# Patient Record
Sex: FEMALE | Race: WHITE | NOT HISPANIC OR LATINO | Employment: UNEMPLOYED | URBAN - METROPOLITAN AREA
[De-identification: names, ages, dates, MRNs, and addresses within clinical notes are randomized per-mention and may not be internally consistent; named-entity substitution may affect disease eponyms.]

---

## 2024-01-07 ENCOUNTER — HOSPITAL ENCOUNTER (EMERGENCY)
Facility: HOSPITAL | Age: 29
Discharge: HOME | End: 2024-01-07
Attending: EMERGENCY MEDICINE | Admitting: EMERGENCY MEDICINE
Payer: COMMERCIAL

## 2024-01-07 ENCOUNTER — APPOINTMENT (OUTPATIENT)
Dept: CT IMAGING | Facility: HOSPITAL | Age: 29
End: 2024-01-07
Payer: COMMERCIAL

## 2024-01-07 ENCOUNTER — APPOINTMENT (OUTPATIENT)
Dept: GENERAL RADIOLOGY | Facility: HOSPITAL | Age: 29
End: 2024-01-07
Payer: COMMERCIAL

## 2024-01-07 VITALS
DIASTOLIC BLOOD PRESSURE: 68 MMHG | BODY MASS INDEX: 36.44 KG/M2 | WEIGHT: 246.03 LBS | OXYGEN SATURATION: 96 % | HEART RATE: 61 BPM | TEMPERATURE: 98.6 F | RESPIRATION RATE: 18 BRPM | SYSTOLIC BLOOD PRESSURE: 99 MMHG | HEIGHT: 69 IN

## 2024-01-07 DIAGNOSIS — F14.10 COCAINE ABUSE: ICD-10-CM

## 2024-01-07 DIAGNOSIS — R73.9 HYPERGLYCEMIA: ICD-10-CM

## 2024-01-07 DIAGNOSIS — F10.10 ALCOHOL ABUSE: ICD-10-CM

## 2024-01-07 DIAGNOSIS — R55 SYNCOPE, UNSPECIFIED SYNCOPE TYPE: Primary | ICD-10-CM

## 2024-01-07 DIAGNOSIS — R56.9 SEIZURE-LIKE ACTIVITY: ICD-10-CM

## 2024-01-07 LAB
ALBUMIN SERPL-MCNC: 5.1 G/DL (ref 3.5–5.2)
ALBUMIN/GLOB SERPL: 2.1 G/DL
ALP SERPL-CCNC: 108 U/L (ref 39–117)
ALT SERPL W P-5'-P-CCNC: 24 U/L (ref 1–33)
ANION GAP SERPL CALCULATED.3IONS-SCNC: 16 MMOL/L (ref 5–15)
AST SERPL-CCNC: 21 U/L (ref 1–32)
BASOPHILS # BLD AUTO: 0.05 10*3/MM3 (ref 0–0.2)
BASOPHILS NFR BLD AUTO: 0.6 % (ref 0–1.5)
BILIRUB SERPL-MCNC: 0.2 MG/DL (ref 0–1.2)
BUN SERPL-MCNC: 7 MG/DL (ref 6–20)
BUN/CREAT SERPL: 7.8 (ref 7–25)
CALCIUM SPEC-SCNC: 9.2 MG/DL (ref 8.6–10.5)
CHLORIDE SERPL-SCNC: 109 MMOL/L (ref 98–107)
CO2 SERPL-SCNC: 22 MMOL/L (ref 22–29)
CREAT SERPL-MCNC: 0.9 MG/DL (ref 0.57–1)
DEPRECATED RDW RBC AUTO: 42.3 FL (ref 37–54)
EGFRCR SERPLBLD CKD-EPI 2021: 90.6 ML/MIN/1.73
EOSINOPHIL # BLD AUTO: 0.08 10*3/MM3 (ref 0–0.4)
EOSINOPHIL NFR BLD AUTO: 1 % (ref 0.3–6.2)
ERYTHROCYTE [DISTWIDTH] IN BLOOD BY AUTOMATED COUNT: 12.2 % (ref 12.3–15.4)
GLOBULIN UR ELPH-MCNC: 2.4 GM/DL
GLUCOSE SERPL-MCNC: 125 MG/DL (ref 65–99)
HCG SERPL QL: NEGATIVE
HCT VFR BLD AUTO: 42.9 % (ref 34–46.6)
HGB BLD-MCNC: 14.8 G/DL (ref 12–15.9)
HOLD SPECIMEN: NORMAL
IMM GRANULOCYTES # BLD AUTO: 0.03 10*3/MM3 (ref 0–0.05)
IMM GRANULOCYTES NFR BLD AUTO: 0.4 % (ref 0–0.5)
LYMPHOCYTES # BLD AUTO: 3.32 10*3/MM3 (ref 0.7–3.1)
LYMPHOCYTES NFR BLD AUTO: 40.1 % (ref 19.6–45.3)
MCH RBC QN AUTO: 32.8 PG (ref 26.6–33)
MCHC RBC AUTO-ENTMCNC: 34.5 G/DL (ref 31.5–35.7)
MCV RBC AUTO: 95.1 FL (ref 79–97)
MONOCYTES # BLD AUTO: 0.49 10*3/MM3 (ref 0.1–0.9)
MONOCYTES NFR BLD AUTO: 5.9 % (ref 5–12)
NEUTROPHILS NFR BLD AUTO: 4.3 10*3/MM3 (ref 1.7–7)
NEUTROPHILS NFR BLD AUTO: 52 % (ref 42.7–76)
NRBC BLD AUTO-RTO: 0 /100 WBC (ref 0–0.2)
NT-PROBNP SERPL-MCNC: <36 PG/ML (ref 0–450)
PLATELET # BLD AUTO: 323 10*3/MM3 (ref 140–450)
PMV BLD AUTO: 10.3 FL (ref 6–12)
POTASSIUM SERPL-SCNC: 4 MMOL/L (ref 3.5–5.2)
PROT SERPL-MCNC: 7.5 G/DL (ref 6–8.5)
QT INTERVAL: 378 MS
QTC INTERVAL: 452 MS
RBC # BLD AUTO: 4.51 10*6/MM3 (ref 3.77–5.28)
SODIUM SERPL-SCNC: 147 MMOL/L (ref 136–145)
TROPONIN T SERPL HS-MCNC: <6 NG/L
WBC NRBC COR # BLD AUTO: 8.27 10*3/MM3 (ref 3.4–10.8)
WHOLE BLOOD HOLD COAG: NORMAL
WHOLE BLOOD HOLD SPECIMEN: NORMAL

## 2024-01-07 PROCEDURE — 83880 ASSAY OF NATRIURETIC PEPTIDE: CPT | Performed by: EMERGENCY MEDICINE

## 2024-01-07 PROCEDURE — 70450 CT HEAD/BRAIN W/O DYE: CPT

## 2024-01-07 PROCEDURE — 93005 ELECTROCARDIOGRAM TRACING: CPT | Performed by: EMERGENCY MEDICINE

## 2024-01-07 PROCEDURE — 85025 COMPLETE CBC W/AUTO DIFF WBC: CPT

## 2024-01-07 PROCEDURE — 96360 HYDRATION IV INFUSION INIT: CPT

## 2024-01-07 PROCEDURE — 84484 ASSAY OF TROPONIN QUANT: CPT | Performed by: EMERGENCY MEDICINE

## 2024-01-07 PROCEDURE — 25810000003 SODIUM CHLORIDE 0.9 % SOLUTION: Performed by: EMERGENCY MEDICINE

## 2024-01-07 PROCEDURE — 84703 CHORIONIC GONADOTROPIN ASSAY: CPT

## 2024-01-07 PROCEDURE — 71045 X-RAY EXAM CHEST 1 VIEW: CPT

## 2024-01-07 PROCEDURE — 99284 EMERGENCY DEPT VISIT MOD MDM: CPT

## 2024-01-07 PROCEDURE — 80053 COMPREHEN METABOLIC PANEL: CPT

## 2024-01-07 RX ORDER — SODIUM CHLORIDE 0.9 % (FLUSH) 0.9 %
10 SYRINGE (ML) INJECTION AS NEEDED
Status: DISCONTINUED | OUTPATIENT
Start: 2024-01-07 | End: 2024-01-07 | Stop reason: HOSPADM

## 2024-01-07 RX ADMIN — SODIUM CHLORIDE 1000 ML: 9 INJECTION, SOLUTION INTRAVENOUS at 10:47

## 2024-01-07 NOTE — DISCHARGE INSTRUCTIONS
You have been given emergency department evaluation.  This evaluation is intended to rule out life-threatening conditions.  Is not a complete evaluation.  You could require further testing as determined by your primary care physician or any referred specialist.  Please follow-up with all doctors that you are referred to.  Please be sure to take your prescribed medications and follow any specific instructions in the discharge instructions.  Please follow-up with your primary care physician within 48 hours.  Please have your primary care provider recheck your blood pressure.  Please return to the emergency department if you experience chest pain, shortness of breath, abdominal pain, fever greater than 102, intractable vomiting.  Please return to the emergency department if your symptoms continue or worsen, or if you begin to experience any other concerning symptom.    You have been seen for seizure-like activity.  You will need to follow-up with your primary care and the referred neurologist.  Please do not drive, operate machinery, swim, bathe alone, or engage in any other dangerous activities until cleared to do so by either your primary care doctor or your neurologist.      You have been seen for substance abuse related complaint.  Please stay with a responsible adult for 24 hours.  Please avoid driving for 24 hours.  Please quit taking alcohol/drugs, as it can result in your death or permanent disability.    Substance abuse treatment centers  The following list are some of the available alcohol and drug abuse treatment centers in the [facility] area.  This list is by no means inclusive of all available options, nor does it imply that these options are better than any other in the area.  Cost and available treatment plans to vary from site to site.  You will need to call, to see which if any of these options will work for your needs.    [facility]  [address]  [facility] [address]  [phone]    [facility]  [address] [place] [address]  [phone]    [facility]  [address] [facility] [address]    Alcoholics anonymous [address]  [phone]    [facility]-medical stabilization service  [address] [id number] [phone]    [facility], Addiction recovery  [address]  [address] [place] [address]  [phone]  [facility]  [address], [place] [address] [phone]    [facility] [address] [id number] [phone]

## 2024-01-07 NOTE — ED PROVIDER NOTES
EMERGENCY DEPARTMENT ENCOUNTER  Room Number:  11/11  Date of encounter:  1/7/2024  PCP: Provider, No Known  Patient Care Team:  Provider, No Known as PCP - General     HPI:  Context: Miguel Le is a 28 y.o. female who presents to the ED c/o chief complaint of syncope.  Patient reports that she was out drinking last night, had multiple beers as well as several shots, also reports that she did a small amount of cocaine, denies any other illicit substances.  Patient reports that she was doing alcohol and drugs for recreational purposes, prior to going out she was at [facility].  Patient reports that she woke up extremely hung over this morning.  Patient reports that she was sitting on the edge of the tub, felt shaky, sweaty, passed out.  Patient reports that there was seizure-like activity, denies any incontinence of bowel or bladder, no oral lingual lacerations, denies any injury occurred.  Patient reports that she was confused afterwards.  Patient denied any chest pain shortness of breath or palpitations.  Patient denies any history of syncope in the past, no cardiac history, denies any history of seizures but does report that she has first-degree relative with seizures.    MEDICAL HISTORY REVIEW  Reviewed in [facility]    PAST MEDICAL HISTORY  Active Ambulatory Problems     Diagnosis Date Noted    No Active Ambulatory Problems     Resolved Ambulatory Problems     Diagnosis Date Noted    No Resolved Ambulatory Problems     No Additional Past Medical History       PAST SURGICAL HISTORY  No past surgical history on file.    FAMILY HISTORY  No family history on file.    SOCIAL HISTORY  Social History     Socioeconomic History    Marital status:        ALLERGIES  Patient has no known allergies.    The patient's allergies have been reviewed    REVIEW OF SYSTEMS  All systems reviewed and negative except for those discussed in HPI.     PHYSICAL EXAM  I have reviewed the triage vital signs and nursing notes.  ED  Triage Vitals   Temp Heart Rate Resp BP SpO2   01/07/24 1019 01/07/24 1019 01/07/24 1019 01/07/24 1021 01/07/24 1019   98.6 °F (37 °C) 106 18 119/82 97 %      Temp src Heart Rate Source Patient Position BP Location FiO2 (%)   01/07/24 1019 01/07/24 1019 01/07/24 1019 01/07/24 1019 --   Tympanic Monitor Sitting Right arm        General: No acute distress.  HENT: NCAT, PERRL, Nares patent.  Eyes: no scleral icterus.  Neck: trachea midline, no ROM limitations.  CV: regular rhythm, regular rate.  Respiratory: normal effort, CTAB.  Abdomen: soft, nondistended, NTTP, no rebound tenderness, no guarding or rigidity.  Musculoskeletal: no deformity.  Neuro: Alert and oriented x3, extraocular motion intact, pupils are equal and round reactive to light, cranial nerves II through XII are grossly intact, normal speech, moves all extremities well, 5 out of 5 strength all 4 extremities, sensation intact light touch all 4 extremities, no ataxia.  Skin: warm, dry.    LAB RESULTS  Recent Results (from the past 24 hour(s))   Comprehensive Metabolic Panel    Collection Time: 01/07/24 10:25 AM    Specimen: Blood   Result Value Ref Range    Glucose 125 (H) 65 - 99 mg/dL    BUN 7 6 - 20 mg/dL    Creatinine 0.90 0.57 - 1.00 mg/dL    Sodium 147 (H) 136 - 145 mmol/L    Potassium 4.0 3.5 - 5.2 mmol/L    Chloride 109 (H) 98 - 107 mmol/L    CO2 22.0 22.0 - 29.0 mmol/L    Calcium 9.2 8.6 - 10.5 mg/dL    Total Protein 7.5 6.0 - 8.5 g/dL    Albumin 5.1 3.5 - 5.2 g/dL    ALT (SGPT) 24 1 - 33 U/L    AST (SGOT) 21 1 - 32 U/L    Alkaline Phosphatase 108 39 - 117 U/L    Total Bilirubin 0.2 0.0 - 1.2 mg/dL    Globulin 2.4 gm/dL    A/G Ratio 2.1 g/dL    BUN/Creatinine Ratio 7.8 7.0 - 25.0    Anion Gap 16.0 (H) 5.0 - 15.0 mmol/L    eGFR 90.6 >60.0 mL/min/1.73   hCG, Serum, Qualitative    Collection Time: 01/07/24 10:25 AM    Specimen: Blood   Result Value Ref Range    HCG Qualitative Negative Negative   Green Top (Gel)    Collection Time: 01/07/24 10:25 AM    Result Value Ref Range    Extra Tube Hold for add-ons.    Lavender Top    Collection Time: 01/07/24 10:25 AM   Result Value Ref Range    Extra Tube hold for add-on    Light Blue Top    Collection Time: 01/07/24 10:25 AM   Result Value Ref Range    Extra Tube Hold for add-ons.    CBC Auto Differential    Collection Time: 01/07/24 10:25 AM    Specimen: Blood   Result Value Ref Range    WBC 8.27 3.40 - 10.80 10*3/mm3    RBC 4.51 3.77 - 5.28 10*6/mm3    Hemoglobin 14.8 12.0 - 15.9 g/dL    Hematocrit 42.9 34.0 - 46.6 %    MCV 95.1 79.0 - 97.0 fL    MCH 32.8 26.6 - 33.0 pg    MCHC 34.5 31.5 - 35.7 g/dL    RDW 12.2 (L) 12.3 - 15.4 %    RDW-SD 42.3 37.0 - 54.0 fl    MPV 10.3 6.0 - 12.0 fL    Platelets 323 140 - 450 10*3/mm3    Neutrophil % 52.0 42.7 - 76.0 %    Lymphocyte % 40.1 19.6 - 45.3 %    Monocyte % 5.9 5.0 - 12.0 %    Eosinophil % 1.0 0.3 - 6.2 %    Basophil % 0.6 0.0 - 1.5 %    Immature Grans % 0.4 0.0 - 0.5 %    Neutrophils, Absolute 4.30 1.70 - 7.00 10*3/mm3    Lymphocytes, Absolute 3.32 (H) 0.70 - 3.10 10*3/mm3    Monocytes, Absolute 0.49 0.10 - 0.90 10*3/mm3    Eosinophils, Absolute 0.08 0.00 - 0.40 10*3/mm3    Basophils, Absolute 0.05 0.00 - 0.20 10*3/mm3    Immature Grans, Absolute 0.03 0.00 - 0.05 10*3/mm3    nRBC 0.0 0.0 - 0.2 /100 WBC   High Sensitivity Troponin T    Collection Time: 01/07/24 10:25 AM    Specimen: Blood   Result Value Ref Range    HS Troponin T <6 <14 ng/L   BNP    Collection Time: 01/07/24 10:25 AM    Specimen: Blood   Result Value Ref Range    proBNP <36.0 0.0 - 450.0 pg/mL   ECG 12 Lead Syncope    Collection Time: 01/07/24 10:46 AM   Result Value Ref Range    QT Interval 378 ms    QTC Interval 452 ms       I ordered the above labs and reviewed the results.    RADIOLOGY  CT Head Without Contrast    Result Date: 1/7/2024  Emergency CT scan of the head on 1/7/2024  CLINICAL HISTORY: Syncope, seizure-like activity this AM  TECHNIQUE: Spiral CT images were obtained from the base of the  skull to the vertex without intravenous contrast. The images were reformatted in smoothen 3 mm thick axial, sagittal and coronal CT sections with brain algorithm and 2 mm thick axial CT sections with high-resolution bone algorithm  There are no prior studies for comparison.  FINDINGS: The brain parenchyma is normal in attenuation. The ventricles are normal in size. I see no focal mass effect and no midline shift and no extra-axial fluid collections are identified and there is no evidence of acute intra cranial hemorrhage. No acute skull fractures identified. The calvarium and skull base are normal in appearance. The paranasal sinuses and the mastoid air cells and middle ear cavities are clear.      1. Normal head CT.  Radiation dose reduction techniques were utilized, including automated exposure control and exposure modulation based on body size.   This report was finalized on 1/7/2024 12:58 PM by Dr. Luciano Vargas M.D on Workstation: [email or web address]      XR Chest 1 View    Result Date: 1/7/2024  ONE-VIEW PORTABLE CHEST  HISTORY: Dizziness. Weakness. Possible seizure.  FINDINGS: The lungs are well expanded and clear and the heart and hilar structures are normal. There is no acute disease.  This report was finalized on 1/7/2024 11:22 AM by Dr. Faisal Harper M.D on Workstation: [id number]       I ordered the above noted radiological studies. I reviewed the images and results. I agree with the radiologist interpretation.    PROCEDURES  Procedures    MEDICATIONS GIVEN IN ER  Medications   sodium chloride 0.9 % flush 10 mL (has no administration in time range)   sodium chloride 0.9 % bolus 1,000 mL (1,000 mL Intravenous New Bag 1/7/24 1047)       PROGRESS, DATA ANALYSIS, CONSULTS, AND MEDICAL DECISION MAKING  A complete history and physical exam have been performed.  All available laboratory and imaging results have been reviewed by myself prior to disposition.    MDM    After the initial H&P, I discussed pertinent information  from history and physical exam with patient/family.  Discussed differential diagnosis.  Discussed plan for ED evaluation/workup/treatment.  All questions answered.  Patient/family is agreeable with plan.  ED Course as of 01/07/24 1302   Sun Jan 07, 2024   1043 My differential diagnosis for syncope includes but is not limited to:  Vasovagal reflex - situational stimulus, micturition, defecation, cough, sneezing, swallowing, postprandial state, react sinus hypersensitivity  Vascular-prolonged recumbency, sudden postural change, prolonged standing, hypovolemia, vasodilator drugs, autonomic neuropathy, adrenal insufficiency, subclavian steal, pulmonary embolism  Cardiac -arrhythmia, heart block, myocardial infarction, aortic stenosis, cardiac myxoma, cardiac, LV Dysfunction, Aortic Dissection, Pulmonary Hypertension, Pulmonary Stenosis, Pacemaker Failure  CNS-seizure, hypoxia, hypoglycemia, TIA,(basal vertebral), hydrocephalus     [JG]   1050 EKG independently viewed and contemporaneously interpreted by ED physician. Time: 10:46 AM.  Rate 86.  Interpretation: Normal sinus rhythm, normal axis, left atrial enlargement, normal QRS, no acute ST changes. [JG]   1109 Orthostatic positive, did not have dizziness associated, patient receiving IV fluids. [JG]   1140 Reviewed chest x-ray in PACS, no pulmonary infiltrates per my read. [JG]   1141 Patient placed on cardiac monitor for complaint of syncope, my interpretation is normal sinus rhythm. [JG]   1225 I reviewed CT head imaging in PACS, no intracranial hemorrhage per my read. [JG]   1256 Phone call with radiologist.  I had previously reviewed the images. I discussed the patient, imaging, and their interpretation.  CT head imaging negative for acute pathology.  See dictated report for final interpretation.     [JG]   1300 ED workup is unremarkable other than slight hyperglycemia.  Patient is well-appearing appears appropriate for discharge with outpatient follow-up. [JG]    1300 Patient reassessed, discussed ED workup and results, discussed plan for discharge.  Discussed need for follow-up with primary care, discussed referral to neurology.  Patient given seizure precautions, instructed not to drive until released by primary care and neurology.  Given resources to help out with alcohol and drug cessation.  Given extensive discussion return precautions, discharging. [JG]      ED Course User Index  [JG] Sherman Ken MD       AS OF 13:02 EST VITALS:    BP - 104/69  HR - 80  TEMP - 98.6 °F (37 °C) (Tympanic)  O2 SATS - 97%    DIAGNOSIS  Final diagnoses:   Syncope, unspecified syncope type   Seizure-like activity   Hyperglycemia   Alcohol abuse   Cocaine abuse         DISPOSITION  DISCHARGE    Patient discharged in stable condition.    Reviewed implications of results, diagnosis, meds, responsibility to follow up, warning signs and symptoms of possible worsening, potential complications and reasons to return to ER.    Patient/Family voiced understanding of above instructions.    Discussed plan for discharge, as there is no emergent indication for admission. Patient referred to primary care provider for BP management due to today's BP. Pt/family is agreeable and understands need for follow up and repeat testing.  Pt is aware that discharge does not mean that nothing is wrong but it indicates no emergency is present that requires admission and they must continue care with follow-up as given below or physician of their choice.     FOLLOW-UP  Your PCP    Schedule an appointment as soon as possible for a visit in 2 days  even if well    PATIENT CONNECTION - [facility] [id number] [phone]    if you are unable to follow up with your PCP    [facility]  [address] [facility] [address]  [facility] [address] [phone]  Schedule an appointment as soon as possible for a visit in 2 days           Medication List      No changes were made to  your prescriptions during this visit.            Sherman Ken MD  01/07/24 2147

## 2024-01-09 ENCOUNTER — TELEPHONE (OUTPATIENT)
Dept: NEUROLOGY | Facility: OTHER | Age: 29
End: 2024-01-09
Payer: COMMERCIAL

## 2024-01-09 NOTE — TELEPHONE ENCOUNTER
PT WAS DISCHARGED FROM E.R  WAS TOLD DIDN'T NEED REF AS HER NOTES WERE IN HER CHART , THAT IS NOT PROTOCOL , NEED INTERNAL REFERRAL I ASKED IF THEY GAVE HER SUGGESTIONS FOR PCP , WAS NOT GIVEN ANY , I GOT HOLD OF PCP [facility] OFFICE , TRANS FOR HER TO TRY AND GET APPT, ASKED IF SHE CAN'T GET ONE TILL LATER TO CALL E.R BACK LET THEM NO PROTOCOL IS FOR THEM TO PUT INTERNAL REF IN IF FEEL SHE NEEDS TO BE SEEN   SHE WAS GIVEN NO RX AND WAS TOLD NO DRIVING

## 2024-01-10 ENCOUNTER — OFFICE VISIT (OUTPATIENT)
Dept: FAMILY MEDICINE CLINIC | Facility: CLINIC | Age: 29
End: 2024-01-10
Payer: COMMERCIAL

## 2024-01-10 ENCOUNTER — PATIENT ROUNDING (BHMG ONLY) (OUTPATIENT)
Dept: FAMILY MEDICINE CLINIC | Facility: CLINIC | Age: 29
End: 2024-01-10
Payer: COMMERCIAL

## 2024-01-10 VITALS
HEIGHT: 69 IN | BODY MASS INDEX: 36.43 KG/M2 | SYSTOLIC BLOOD PRESSURE: 110 MMHG | HEART RATE: 79 BPM | WEIGHT: 246 LBS | DIASTOLIC BLOOD PRESSURE: 74 MMHG | OXYGEN SATURATION: 99 %

## 2024-01-10 DIAGNOSIS — Z82.0 FAMILY HISTORY OF SEIZURE DISORDER: ICD-10-CM

## 2024-01-10 DIAGNOSIS — Z00.00 ROUTINE HEALTH MAINTENANCE: ICD-10-CM

## 2024-01-10 DIAGNOSIS — R56.9 SEIZURE-LIKE ACTIVITY: Primary | ICD-10-CM

## 2024-01-10 NOTE — PROGRESS NOTES
A My-Chart message has been sent to the patient for PATIENT ROUNDING with [facility] [place], [place]

## 2024-01-10 NOTE — PROGRESS NOTES
"Chief Complaint  Establish Care and Seizures (Had a episode Sunday. Wants referral to neurology )    Subjective        HPI   Miguel presents to [facility] for a new patient visit. She was seen in the ED 1/7 for syncope versus seizure.     Pt relates that the night before episode, was drinking ETOH, didn't have dinner prior. She drank approx 4-5 beers, does not drink regularly. Also did what she describes as a tiny amount of cocaine, also states she does not use regularly (maybe 2-3x per year). She never did eat that night. She awakened the next morning with plans to go to [employer] (still hadn't eaten by this point), went to the bathroom, started having cold sweats, sat down on side of tub, then was found down. No incontinence, tongue biting. Took her awhile for her to regain mental status. Reportedly, her fingers and lips were purple and she had some L sided arm and facial weakness.     No medical problems otherwise. Prior PCP was in [place].     FH: Sister does have seizures, first at 26        Objective   Vital Signs:  Vitals:    01/10/24 1059   BP: 110/74   BP Location: Left arm   Patient Position: Sitting   Cuff Size: Large Adult   Pulse: 79   SpO2: 99%   Weight: 112 kg (246 lb)   Height: 175.3 cm (69\")          Physical Exam  Constitutional:       General: She is not in acute distress.     Appearance: Normal appearance. She is not toxic-appearing.   HENT:      Head: Normocephalic and atraumatic.      Mouth/Throat:      Mouth: Mucous membranes are moist.   Eyes:      General: No scleral icterus.     Conjunctiva/sclera: Conjunctivae normal.   Cardiovascular:      Rate and Rhythm: Normal rate and regular rhythm.      Heart sounds: Normal heart sounds. No murmur heard.     No friction rub. No gallop.   Pulmonary:      Effort: Pulmonary effort is normal. No respiratory distress.      Breath sounds: Normal breath sounds.   Musculoskeletal:         General: No swelling, tenderness or deformity. " Normal range of motion.      Cervical back: Normal range of motion and neck supple.   Skin:     General: Skin is warm and dry.      Findings: No lesion or rash.   Neurological:      General: No focal deficit present.      Mental Status: She is alert and oriented to person, place, and time.   Psychiatric:         Mood and Affect: Mood normal.         Behavior: Behavior normal.         Judgment: Judgment normal.          Result Review :     The following data was reviewed by: Vero Velazco MD on 01/10/2024:  CBC & Differential (01/07/2024 10:25)  Comprehensive Metabolic Panel (01/07/2024 10:25)  hCG, Serum, Qualitative (01/07/2024 10:25)  High Sensitivity Troponin T (01/07/2024 10:25)  BNP (01/07/2024 10:25)  CT Head Without Contrast (01/07/2024 12:22)  XR Chest 1 View (01/07/2024 11:00)         Assessment and Plan    Diagnoses and all orders for this visit:    1. Seizure-like activity (Primary)  Assessment & Plan:  Pt presented to ED with seizure versus syncope. She did have what sounds like a post-ictal state and weakness in L arm and face. Work up was overall unremarkable besides Na 147 (likely dehydrated). Her sister has seizures, her first seizure was at age 26 as well. It's possible her first seizure was provoked by ETOH/cocaine use and dehydration/hypoglycemia. It's also possible she had convulsive syncope. I told pt I can't prove this was a seizure but we discussed not driving x 90 days, no water immersion in pools/tubs, etc. in the event this was a seizure. We discussed staying hydrated, no ETOH use, no cocaine use (pt states she does not use either regularly). Pt asked about exercise, OK for light exercise such as walking and casual sand volleyball w friends. MRI brain w and wo contrast ordered as well as urgent neurology referral.     Orders:  -     MRI Brain With & Without Contrast; Future  -     Ambulatory Referral to Neurology    2. Routine health maintenance  Assessment & Plan:  Obtain prior PCP  records. Had labs, pap, AWV in August 2023.       3. Family history of seizure disorder  -     MRI Brain With & Without Contrast; Future  -     Ambulatory Referral to Neurology        Follow Up   Return in about 2 months (around 3/10/2024) for Recheck, Next scheduled follow up.  Patient was given instructions and counseling regarding her condition or for health maintenance advice. Please see specific information pulled into the AVS if appropriate.

## 2024-01-10 NOTE — ASSESSMENT & PLAN NOTE
Pt presented to ED with seizure versus syncope. She did have what sounds like a post-ictal state and weakness in L arm and face. Work up was overall unremarkable besides Na 147 (likely dehydrated). Her sister has seizures, her first seizure was at age 26 as well. It's possible her first seizure was provoked by ETOH/cocaine use and dehydration/hypoglycemia. It's also possible she had convulsive syncope. I told pt I can't prove this was a seizure but we discussed not driving x 90 days, no water immersion in pools/tubs, etc. in the event this was a seizure. We discussed staying hydrated, no ETOH use, no cocaine use (pt states she does not use either regularly). Pt asked about exercise, OK for light exercise such as walking and casual sand volleyball w friends. MRI brain w and wo contrast ordered as well as urgent neurology referral.

## 2024-01-10 NOTE — Clinical Note
Please obtain medical records (labs, pap, last OV note) from [facility], Mery Dawson NP

## 2024-01-25 ENCOUNTER — TELEPHONE (OUTPATIENT)
Dept: FAMILY MEDICINE CLINIC | Facility: CLINIC | Age: 29
End: 2024-01-25
Payer: COMMERCIAL

## 2024-01-25 NOTE — TELEPHONE ENCOUNTER
Caller: [employer]    Best call back number: [phone]     Who are you requesting to speak with (clinical staff, provider,  specific staff member): CLINICAL STAFF    Do you know the name of the person who called: DILLON    What was the call regarding: STATED THAT THEY NEED TO SPEAK WITH SOMEONE ABOUT THE PATIENTS MRI THEY HAVE. STATED THAT THE INSURANCE APPROVED THE TEST BUT DENIED THE LOCATION. PLEASE CALL AND ADVISE

## 2024-01-26 ENCOUNTER — TELEPHONE (OUTPATIENT)
Dept: FAMILY MEDICINE CLINIC | Facility: CLINIC | Age: 29
End: 2024-01-26
Payer: COMMERCIAL

## 2024-01-26 NOTE — TELEPHONE ENCOUNTER
Yue from financial clearance called and stated pt MRI approved but the location was denied   # [phone]

## 2024-02-05 ENCOUNTER — TELEPHONE (OUTPATIENT)
Dept: FAMILY MEDICINE CLINIC | Facility: CLINIC | Age: 29
End: 2024-02-05
Payer: COMMERCIAL

## 2024-02-15 ENCOUNTER — TELEPHONE (OUTPATIENT)
Dept: FAMILY MEDICINE CLINIC | Facility: CLINIC | Age: 29
End: 2024-02-15
Payer: COMMERCIAL

## 2024-03-15 NOTE — PROGRESS NOTES
Mode of Visit: Video  Location of patient: home  [place] [facility] [place]  You have chosen to receive care through a telehealth visit. The patient verbally consented to the video visit.  The visit included audio and video interaction. No technical issues occurred during this visit     Subjective       Chief Complaint   Patient presents with    Follow-up         HPI:        Miguel is a 28 y.o. female who presents to [facility] today for follow up on MRI. EEG and MRI were normal.     She saw neuro NP at [facility] who recommended a 72-hour home EEG.  Patient a bit hesitant to get it done, concerned that she will go through the time and expense to have it be normal and not . She is also wondering if it would be beneficial to look at her heart in the event this was a TIA or syncope. No further syncopal or seizure-like events.             PE:   Objective     There is no height or weight on file to calculate BMI.    Physical Exam   Constitutional: She appears well-developed and well-nourished. No distress.   HENT:   Head: Normocephalic and atraumatic.   Pulmonary/Chest: Effort normal.  No respiratory distress.  Neurological: She is alert.   Psychiatric: She has a normal mood and affect.             The following data was reviewed by: Vero Velazco MD on 03/18/2024:  ECG 12 Lead Syncope (01/07/2024 10:46)        A/P:     Assessment & Plan   Diagnoses and all orders for this visit:    1. Seizure-like activity (Primary)    2. Loss of consciousness  -     Adult Transthoracic Echo Complete W/ Cont if Necessary Per Protocol; Future    3. Abnormal EKG  -     Adult Transthoracic Echo Complete W/ Cont if Necessary Per Protocol; Future    4. Screening for diabetes mellitus  -     Hemoglobin A1c    5. Screening for lipid disorders  -     Lipid Panel    6. Routine health maintenance  -     CBC (No Diff)  -     Comprehensive Metabolic Panel  -     Hemoglobin A1c  -     Lipid  Panel    Counseled patient that this episode is most likely a provoked seizure but cannot rule out syncope. TIA less likely given age and presentation but not impossible.  Her sister has a seizure disorder.  Neurology nurse practitioner has elected to hold off on antiepileptic therapy unless patient has a second event or 72-hour monitoring is abnormal.  As per HPI, patient hesitates to get the 72-hour test done.  I recommend she discuss with neuro NP.    Pt wondering about getting heart checked.  Certainly, this episode could have been syncopal although seemingly less likely than seizure.  I reviewed her EKG from the emergency department. It was not 100% normal per the cardiology read.  I told patient we could consider an echocardiogram to evaluate for valvulopathies, shunts, etc that could increase risk of TIA/CVA. We decided to proceed with this test, ordered with bubble study.    Labs ordered for prior to her physical, if she chooses to get them done ahead of time. Should be fasting.        Follow up:   Return in about 24 weeks (around 9/2/2024) for Annual physical.

## 2024-03-18 ENCOUNTER — TELEMEDICINE (OUTPATIENT)
Dept: FAMILY MEDICINE CLINIC | Facility: CLINIC | Age: 29
End: 2024-03-18
Payer: COMMERCIAL

## 2024-03-18 DIAGNOSIS — Z13.1 SCREENING FOR DIABETES MELLITUS: ICD-10-CM

## 2024-03-18 DIAGNOSIS — Z00.00 ROUTINE HEALTH MAINTENANCE: ICD-10-CM

## 2024-03-18 DIAGNOSIS — R56.9 SEIZURE-LIKE ACTIVITY: Primary | ICD-10-CM

## 2024-03-18 DIAGNOSIS — Z13.220 SCREENING FOR LIPID DISORDERS: ICD-10-CM

## 2024-03-18 DIAGNOSIS — R40.20 LOSS OF CONSCIOUSNESS: ICD-10-CM

## 2024-03-18 DIAGNOSIS — R94.31 ABNORMAL EKG: ICD-10-CM

## 2024-04-01 ENCOUNTER — HOSPITAL ENCOUNTER (OUTPATIENT)
Dept: CARDIOLOGY | Facility: HOSPITAL | Age: 29
Discharge: HOME | End: 2024-04-01
Admitting: INTERNAL MEDICINE
Payer: COMMERCIAL

## 2024-04-01 VITALS
BODY MASS INDEX: 32.51 KG/M2 | SYSTOLIC BLOOD PRESSURE: 110 MMHG | HEART RATE: 74 BPM | WEIGHT: 240 LBS | HEIGHT: 72 IN | DIASTOLIC BLOOD PRESSURE: 80 MMHG | OXYGEN SATURATION: 98 %

## 2024-04-01 DIAGNOSIS — R40.20 LOSS OF CONSCIOUSNESS: ICD-10-CM

## 2024-04-01 DIAGNOSIS — R94.31 ABNORMAL EKG: ICD-10-CM

## 2024-04-01 LAB
AORTIC ARCH: 2.4 CM
AORTIC DIMENSIONLESS INDEX: 0.6 (DI)
ASCENDING AORTA: 2.6 CM
BH CV ECHO MEAS - ACS: 1.95 CM
BH CV ECHO MEAS - AO MAX PG: 6.5 MMHG
BH CV ECHO MEAS - AO MEAN PG: 4 MMHG
BH CV ECHO MEAS - AO ROOT DIAM: 2.6 CM
BH CV ECHO MEAS - AO V2 MAX: 127.6 CM/SEC
BH CV ECHO MEAS - AO V2 VTI: 32.4 CM
BH CV ECHO MEAS - AVA(I,D): 1.71 CM2
BH CV ECHO MEAS - EDV(CUBED): 109.3 ML
BH CV ECHO MEAS - EDV(MOD-SP2): 109 ML
BH CV ECHO MEAS - EDV(MOD-SP4): 107 ML
BH CV ECHO MEAS - EF(MOD-BP): 62.1 %
BH CV ECHO MEAS - EF(MOD-SP2): 61.5 %
BH CV ECHO MEAS - EF(MOD-SP4): 61.7 %
BH CV ECHO MEAS - ESV(CUBED): 37.9 ML
BH CV ECHO MEAS - ESV(MOD-SP2): 42 ML
BH CV ECHO MEAS - ESV(MOD-SP4): 41 ML
BH CV ECHO MEAS - FS: 29.8 %
BH CV ECHO MEAS - IVS/LVPW: 0.93 CM
BH CV ECHO MEAS - IVSD: 0.89 CM
BH CV ECHO MEAS - LAT PEAK E' VEL: 14.1 CM/SEC
BH CV ECHO MEAS - LV DIASTOLIC VOL/BSA (35-75): 46.5 CM2
BH CV ECHO MEAS - LV MASS(C)D: 151.9 GRAMS
BH CV ECHO MEAS - LV MAX PG: 3.3 MMHG
BH CV ECHO MEAS - LV MEAN PG: 1.85 MMHG
BH CV ECHO MEAS - LV SYSTOLIC VOL/BSA (12-30): 17.8 CM2
BH CV ECHO MEAS - LV V1 MAX: 91.2 CM/SEC
BH CV ECHO MEAS - LV V1 VTI: 21 CM
BH CV ECHO MEAS - LVIDD: 4.8 CM
BH CV ECHO MEAS - LVIDS: 3.4 CM
BH CV ECHO MEAS - LVOT AREA: 2.6 CM2
BH CV ECHO MEAS - LVOT DIAM: 1.83 CM
BH CV ECHO MEAS - LVPWD: 0.96 CM
BH CV ECHO MEAS - MED PEAK E' VEL: 11.1 CM/SEC
BH CV ECHO MEAS - MV A DUR: 0.1 SEC
BH CV ECHO MEAS - MV A MAX VEL: 40 CM/SEC
BH CV ECHO MEAS - MV DEC SLOPE: 523.6 CM/SEC2
BH CV ECHO MEAS - MV DEC TIME: 0.18 SEC
BH CV ECHO MEAS - MV E MAX VEL: 89.3 CM/SEC
BH CV ECHO MEAS - MV E/A: 2.23
BH CV ECHO MEAS - MV MAX PG: 3.6 MMHG
BH CV ECHO MEAS - MV MEAN PG: 1.04 MMHG
BH CV ECHO MEAS - MV P1/2T: 53.4 MSEC
BH CV ECHO MEAS - MV V2 VTI: 30.5 CM
BH CV ECHO MEAS - MVA(P1/2T): 4.1 CM2
BH CV ECHO MEAS - MVA(VTI): 1.82 CM2
BH CV ECHO MEAS - PA ACC TIME: 0.08 SEC
BH CV ECHO MEAS - PA V2 MAX: 85.5 CM/SEC
BH CV ECHO MEAS - PULM A REVS DUR: 0.09 SEC
BH CV ECHO MEAS - PULM A REVS VEL: 24.8 CM/SEC
BH CV ECHO MEAS - PULM DIAS VEL: 33.5 CM/SEC
BH CV ECHO MEAS - PULM S/D: 1.55
BH CV ECHO MEAS - PULM SYS VEL: 51.9 CM/SEC
BH CV ECHO MEAS - QP/QS: 0.66
BH CV ECHO MEAS - RAP SYSTOLE: 3 MMHG
BH CV ECHO MEAS - RV MAX PG: 2.46 MMHG
BH CV ECHO MEAS - RV V1 MAX: 78.3 CM/SEC
BH CV ECHO MEAS - RV V1 VTI: 16.4 CM
BH CV ECHO MEAS - RVOT DIAM: 1.69 CM
BH CV ECHO MEAS - RVSP: 20.5 MMHG
BH CV ECHO MEAS - SI(MOD-SP2): 29.1 ML/M2
BH CV ECHO MEAS - SI(MOD-SP4): 28.7 ML/M2
BH CV ECHO MEAS - SUP REN AO DIAM: 1.7 CM
BH CV ECHO MEAS - SV(LVOT): 55.5 ML
BH CV ECHO MEAS - SV(MOD-SP2): 67 ML
BH CV ECHO MEAS - SV(MOD-SP4): 66 ML
BH CV ECHO MEAS - SV(RVOT): 36.6 ML
BH CV ECHO MEAS - TAPSE (>1.6): 1.98 CM
BH CV ECHO MEAS - TR MAX PG: 17.5 MMHG
BH CV ECHO MEAS - TR MAX VEL: 209 CM/SEC
BH CV ECHO MEASUREMENTS AVERAGE E/E' RATIO: 7.09
BH CV ECHO SHUNT ASSESSMENT PERFORMED (HIDDEN SCRIPTING): 1
BH CV VAS BP LEFT ARM: NORMAL MMHG
BH CV XLRA - RV BASE: 3.1 CM
BH CV XLRA - RV LENGTH: 7.9 CM
BH CV XLRA - RV MID: 3.6 CM
BH CV XLRA - TDI S': 12.6 CM/SEC
LEFT ATRIUM VOLUME INDEX: 19 ML/M2
SINUS: 2.35 CM
STJ: 2.24 CM

## 2024-04-01 PROCEDURE — 93306 TTE W/DOPPLER COMPLETE: CPT | Performed by: INTERNAL MEDICINE

## 2024-04-01 PROCEDURE — 25510000001 PERFLUTREN (DEFINITY) 8.476 MG IN SODIUM CHLORIDE (PF) 0.9 % 10 ML INJECTION: Performed by: INTERNAL MEDICINE

## 2024-04-01 PROCEDURE — 93306 TTE W/DOPPLER COMPLETE: CPT

## 2024-04-01 RX ADMIN — PERFLUTREN 1.5 ML: 6.52 INJECTION, SUSPENSION INTRAVENOUS at 08:43

## 2024-12-20 ENCOUNTER — INITIAL PRENATAL (OUTPATIENT)
Dept: OBSTETRICS AND GYNECOLOGY | Facility: CLINIC | Age: 29
End: 2024-12-20
Payer: COMMERCIAL

## 2024-12-20 VITALS — WEIGHT: 253 LBS | DIASTOLIC BLOOD PRESSURE: 78 MMHG | BODY MASS INDEX: 34.31 KG/M2 | SYSTOLIC BLOOD PRESSURE: 116 MMHG

## 2024-12-20 DIAGNOSIS — Z3A.01 LESS THAN 8 WEEKS GESTATION OF PREGNANCY: Primary | ICD-10-CM

## 2024-12-20 DIAGNOSIS — Z34.91 INITIAL OBSTETRIC VISIT IN FIRST TRIMESTER: ICD-10-CM

## 2024-12-20 LAB
B-HCG UR QL: POSITIVE
EXPIRATION DATE: ABNORMAL
INTERNAL NEGATIVE CONTROL: ABNORMAL
INTERNAL POSITIVE CONTROL: ABNORMAL
Lab: ABNORMAL

## 2024-12-20 NOTE — PROGRESS NOTES
CC: Initial obstetric visit    HPI: 29-year-old  2 para 0 presents at 7-4/7 weeks for her initial obstetric visit.  Overall, she is feeling well.  She has some episodes of nausea, but no vomiting.  Denies any major medical problems.  Obstetric history is significant for a medical  x 1.  Surgical history significant for tonsillectomy.    Review of systems    This is positive for nausea.  Negative for significant vomiting.  Negative for abdominal or pelvic pain.  All other systems are reviewed and are negative.    Assessment and plan    1.  Intrauterine pregnancy in the first trimester.  I recommend an ultrasound to confirm estimated gestational age and the patient agrees.  2.  Prenatal counseling was undertaken.  The patient is taking prenatal vitamins.  Prenatal labs have been ordered.  3.  Counseled regarding genetic screening.  The baby's father has a family history of a genetic abnormality.  He is uncertain of the name of it.  We discussed invasive versus noninvasive prenatal screening versus no prenatal screening.  The patient is considering noninvasive prenatal screening, but will decide by the next visit.  4.  Nausea in pregnancy.  Counseled regarding lifestyle modifications such as frequent small meals, the use of genia.  We also discussed the possibility of medical treatment with vitamin B6 or antiemetics.  The patient declines prescription medications at this time.

## 2024-12-22 LAB
BACTERIA UR CULT: NORMAL
BACTERIA UR CULT: NORMAL

## 2024-12-24 LAB
AMPHETAMINES UR QL SCN: NEGATIVE NG/ML
BARBITURATES UR QL SCN: NEGATIVE NG/ML
BENZODIAZ UR QL: NEGATIVE NG/ML
BZE UR QL: NEGATIVE NG/ML
CANNABINOIDS UR QL SCN: NEGATIVE NG/ML
METHADONE UR QL SCN: NEGATIVE NG/ML
OPIATES UR QL: NEGATIVE NG/ML
PCP UR QL SCN: NEGATIVE NG/ML
PROPOXYPH UR QL SCN: NEGATIVE NG/ML

## 2024-12-26 LAB
ABO GROUP BLD: ABNORMAL
BASOPHILS # BLD AUTO: 0 X10E3/UL (ref 0–0.2)
BASOPHILS NFR BLD AUTO: 0 %
BLD GP AB SCN SERPL QL: NEGATIVE
EOSINOPHIL # BLD AUTO: 0.1 X10E3/UL (ref 0–0.4)
EOSINOPHIL NFR BLD AUTO: 1 %
ERYTHROCYTE [DISTWIDTH] IN BLOOD BY AUTOMATED COUNT: 11.4 % (ref 11.7–15.4)
HBV SURFACE AG SERPL QL IA: NEGATIVE
HCT VFR BLD AUTO: 38 % (ref 34–46.6)
HCV AB SERPL QL IA: NORMAL
HCV IGG SERPL QL IA: NON REACTIVE
HGB BLD-MCNC: 12.8 G/DL (ref 11.1–15.9)
HIV 1+2 AB+HIV1 P24 AG SERPL QL IA: NON REACTIVE
IMM GRANULOCYTES # BLD AUTO: 0 X10E3/UL (ref 0–0.1)
IMM GRANULOCYTES NFR BLD AUTO: 0 %
LYMPHOCYTES # BLD AUTO: 2.1 X10E3/UL (ref 0.7–3.1)
LYMPHOCYTES NFR BLD AUTO: 17 %
MCH RBC QN AUTO: 31.8 PG (ref 26.6–33)
MCHC RBC AUTO-ENTMCNC: 33.7 G/DL (ref 31.5–35.7)
MCV RBC AUTO: 94 FL (ref 79–97)
MONOCYTES # BLD AUTO: 0.7 X10E3/UL (ref 0.1–0.9)
MONOCYTES NFR BLD AUTO: 6 %
NEUTROPHILS # BLD AUTO: 9.2 X10E3/UL (ref 1.4–7)
NEUTROPHILS NFR BLD AUTO: 76 %
PLATELET # BLD AUTO: 365 X10E3/UL (ref 150–450)
RBC # BLD AUTO: 4.03 X10E6/UL (ref 3.77–5.28)
RH BLD: POSITIVE
RUBV IGG SERPL IA-ACNC: 16.5 INDEX
TREPONEMA PALLIDUM IGG+IGM AB [PRESENCE] IN SERUM OR PLASMA BY IMMUNOASSAY: NON REACTIVE
WBC # BLD AUTO: 12.1 X10E3/UL (ref 3.4–10.8)

## 2025-01-14 ENCOUNTER — ROUTINE PRENATAL (OUTPATIENT)
Dept: OBSTETRICS AND GYNECOLOGY | Facility: CLINIC | Age: 30
End: 2025-01-14
Payer: COMMERCIAL

## 2025-01-14 VITALS — BODY MASS INDEX: 34.18 KG/M2 | SYSTOLIC BLOOD PRESSURE: 116 MMHG | DIASTOLIC BLOOD PRESSURE: 78 MMHG | WEIGHT: 252 LBS

## 2025-01-14 DIAGNOSIS — Z3A.11 11 WEEKS GESTATION OF PREGNANCY: Primary | ICD-10-CM

## 2025-01-14 LAB
GLUCOSE UR STRIP-MCNC: NEGATIVE MG/DL
PROT UR STRIP-MCNC: NEGATIVE MG/DL

## 2025-01-14 NOTE — PROGRESS NOTES
CC: Obstetric visit    HPI: 29-year-old  2 para 0 presents at 11 and 1 sevenths weeks for her obstetric visit.  Overall, she is feeling well.  Does not have nausea and vomiting.  Does not have abdominal or pelvic pain.    Review of systems    This is negative for fever or chills.  Negative for abdominal or pelvic pain.  Negative for vaginal bleeding.    Physical examination    The abdomen is soft and gravid.  There is no epigastric tenderness.  No fundal tenderness.  No CVA tenderness.  No suprapubic tenderness.  Extremities are equal in size    Assessment and plan    1.  Intrauterine pregnancy at 11 and 1 sevenths weeks  2.  The previously visualized subchorionic hematoma has completely resolved on today's ultrasound.  Counseled and questions answered.  3.  Counseled regarding genetic screening.  The patient would like to do noninvasive prenatal screening today.  This has been ordered.  There is a family history of duplication of chromosome 14 on the paternal side.  We discussed referral to maternal-fetal medicine for more specific testing.  The patient declines this.

## 2025-01-20 LAB
5P15 DELETION (CRI-DU-CHAT): NEGATIVE
CFDNA.FET/CFDNA.TOTAL SFR FETUS: NORMAL %
CITATION REF LAB TEST: NORMAL
FET 13+18+21+X+Y ANEUP PLAS.CFDNA: NEGATIVE
FET 1P36 DEL RISK WBC.DNA+CFDNA QL: NEGATIVE
FET 22Q11.2 DEL RISK WBC.DNA+CFDNA QL: NEGATIVE
FET CHR 11Q23 DEL PLAS.CFDNA QL: NEGATIVE
FET CHR 15Q11 DEL PLAS.CFDNA QL: NEGATIVE
FET CHR 21 TS PLAS.CFDNA QL: NEGATIVE
FET CHR 4P16 DEL PLAS.CFDNA QL: NEGATIVE
FET CHR 8Q24 DEL PLAS.CFDNA QL: NEGATIVE
FET MS X RISK WBC.DNA+CFDNA QL: NEGATIVE
FET SEX PLAS.CFDNA DOSAGE CFDNA: NORMAL
FET TS 13 RISK PLAS.CFDNA QL: NEGATIVE
FET TS 18 RISK WBC.DNA+CFDNA QL: NEGATIVE
FET X + Y ANEUP RISK PLAS.CFDNA SEQ-IMP: NEGATIVE
GA EST FROM CONCEPTION DATE: NORMAL D
GAINS/LOSSES >=7 MB: NEGATIVE
GESTATIONAL AGE > 9:: YES
LAB DIRECTOR NAME PROVIDER: NORMAL
LAB DIRECTOR NAME PROVIDER: NORMAL
LABORATORY COMMENT REPORT: NORMAL
LIMITATIONS OF THE TEST: NORMAL
NOTE: NORMAL
OTHER AUTOSOMAL ANEUPLOIDIES: NEGATIVE
PERFORMANCE CHARACTERISTICS: NORMAL
POSITIVE PREDICTIVE VALUE: NORMAL
REF LAB TEST METHOD: NORMAL
TEST PERFORMANCE INFO SPEC: NORMAL

## 2025-02-03 ENCOUNTER — TELEPHONE (OUTPATIENT)
Dept: OBSTETRICS AND GYNECOLOGY | Facility: CLINIC | Age: 30
End: 2025-02-03
Payer: COMMERCIAL

## 2025-02-11 ENCOUNTER — ROUTINE PRENATAL (OUTPATIENT)
Dept: OBSTETRICS AND GYNECOLOGY | Facility: CLINIC | Age: 30
End: 2025-02-11
Payer: COMMERCIAL

## 2025-02-11 VITALS — BODY MASS INDEX: 34.18 KG/M2 | DIASTOLIC BLOOD PRESSURE: 76 MMHG | SYSTOLIC BLOOD PRESSURE: 116 MMHG | WEIGHT: 252 LBS

## 2025-02-11 DIAGNOSIS — Z3A.15 15 WEEKS GESTATION OF PREGNANCY: Primary | ICD-10-CM

## 2025-02-11 PROCEDURE — 0502F SUBSEQUENT PRENATAL CARE: CPT | Performed by: OBSTETRICS & GYNECOLOGY

## 2025-02-11 NOTE — PROGRESS NOTES
CC: Obstetric visit    HPI: 29-year-old  2 para 0 presents at 15 and 1 sevenths weeks for her obstetric visit.  Her nausea has improved.  She has occasional headaches, but they have responded to Tylenol.    Review of systems    This is negative for fever or chills.  Negative for nausea or vomiting.  Negative for abdominal or pelvic pain.    Physical examination    The abdomen is soft and gravid.  There is no epigastric tenderness.  No fundal tenderness.  No CVA tenderness.  No suprapubic tenderness.  Extremities are equal in size    Assessment and plan    1.  Intrauterine pregnancy at 15-5/7 weeks  2.  Free fetal DNA testing was negative.  Counseled regarding additional genetic screening.  Cystic fibrosis carrier screening was ordered at the last visit, but not performed.  The patient would like to do this test today.  Also, we discussed AFP testing for spina bifida.  The patient would like to proceed.  These have been ordered.  3.  Screening ultrasound at the next visit.

## 2025-03-25 ENCOUNTER — ROUTINE PRENATAL (OUTPATIENT)
Dept: OBSTETRICS AND GYNECOLOGY | Facility: CLINIC | Age: 30
End: 2025-03-25
Payer: COMMERCIAL

## 2025-03-25 VITALS — DIASTOLIC BLOOD PRESSURE: 78 MMHG | WEIGHT: 260 LBS | BODY MASS INDEX: 35.26 KG/M2 | SYSTOLIC BLOOD PRESSURE: 116 MMHG

## 2025-03-25 DIAGNOSIS — Z3A.21 21 WEEKS GESTATION OF PREGNANCY: Primary | ICD-10-CM

## 2025-03-25 PROCEDURE — 0502F SUBSEQUENT PRENATAL CARE: CPT | Performed by: OBSTETRICS & GYNECOLOGY

## 2025-03-25 NOTE — PROGRESS NOTES
CC: Obstetric visit    HPI: 29-year-old  2 para 0 presents at 21 and 1 sevenths weeks for her obstetric visit.  She has occasionally felt fetal movement.  She has no complaints today.    Review of systems    This is negative for fever or chills.  Negative for nausea or vomiting.  Negative for abdominal or pelvic pain.    Physical examination    The abdomen is soft and gravid.  There is no epigastric tenderness.  No fundal tenderness.  No CVA tenderness.  No suprapubic tenderness.  Extremities are equal in size    Assessment and plan    1.  Intrauterine pregnancy at 21 and 1 sevenths weeks  2.  Screening ultrasound was reviewed and discussed with the patient.  This is a normal anatomic survey with the exception of suboptimal views of the cardiac outflow tracts of the fetal face.  Ultrasound will be repeated at the next visit.  3.  1 hour glucose tolerance test at the next visit.  Counseled.  4.  The patient has elected not to proceed with cystic fibrosis carrier screening or AFP testing.

## 2025-04-22 ENCOUNTER — ROUTINE PRENATAL (OUTPATIENT)
Dept: OBSTETRICS AND GYNECOLOGY | Facility: CLINIC | Age: 30
End: 2025-04-22
Payer: COMMERCIAL

## 2025-04-22 VITALS — BODY MASS INDEX: 36.08 KG/M2 | DIASTOLIC BLOOD PRESSURE: 74 MMHG | WEIGHT: 266 LBS | SYSTOLIC BLOOD PRESSURE: 118 MMHG

## 2025-04-22 DIAGNOSIS — Z3A.25 25 WEEKS GESTATION OF PREGNANCY: Primary | ICD-10-CM

## 2025-04-22 LAB
GLUCOSE UR STRIP-MCNC: NEGATIVE MG/DL
PROT UR STRIP-MCNC: NEGATIVE MG/DL

## 2025-04-22 NOTE — PROGRESS NOTES
CC: Obstetric visit    HPI: 29-year-old  2 para 0 presents at 25 and 1 sevenths weeks for her obstetric visit.  Her baby is moving actively.  She has no complaints today.    Review of systems    This is negative for fever or chills.  Negative for nausea or vomiting.  Negative for abdominal or pelvic pain.    Physical examination    The abdomen is soft and gravid.  There is no epigastric tenderness.  No fundal tenderness.  No CVA tenderness.  No suprapubic tenderness.  Extremities are equal in size    Assessment and plan    1.  Intrauterine pregnancy at 25 and 1 sevenths weeks  2.  1 hour glucose tolerance test today.  3.  Ultrasound was reviewed and discussed with the patient.  The ultrasound was normal with the exception of suboptimal views of the cardiac outflow tracts.  This is our second attempt to visualize these.  While no abnormality was seen, visualization was suboptimal.  I recommend a maternal-fetal medicine ultrasound and the patient agrees.  This has been ordered.

## 2025-05-12 ENCOUNTER — TRANSCRIBE ORDERS (OUTPATIENT)
Dept: ULTRASOUND IMAGING | Facility: HOSPITAL | Age: 30
End: 2025-05-12
Payer: COMMERCIAL

## 2025-05-12 DIAGNOSIS — O28.3 ABNORMAL FETAL ULTRASOUND: Primary | ICD-10-CM

## 2025-05-13 ENCOUNTER — HOSPITAL ENCOUNTER (OUTPATIENT)
Dept: ULTRASOUND IMAGING | Facility: HOSPITAL | Age: 30
Discharge: HOME | End: 2025-05-13
Admitting: STUDENT IN AN ORGANIZED HEALTH CARE EDUCATION/TRAINING PROGRAM
Payer: COMMERCIAL

## 2025-05-13 ENCOUNTER — OFFICE VISIT (OUTPATIENT)
Dept: OBSTETRICS AND GYNECOLOGY | Facility: CLINIC | Age: 30
End: 2025-05-13
Payer: COMMERCIAL

## 2025-05-13 VITALS
TEMPERATURE: 97.7 F | WEIGHT: 267 LBS | BODY MASS INDEX: 36.21 KG/M2 | SYSTOLIC BLOOD PRESSURE: 125 MMHG | HEART RATE: 106 BPM | OXYGEN SATURATION: 99 % | DIASTOLIC BLOOD PRESSURE: 78 MMHG

## 2025-05-13 DIAGNOSIS — Z3A.28 28 WEEKS GESTATION OF PREGNANCY: ICD-10-CM

## 2025-05-13 DIAGNOSIS — O28.3 ABNORMAL FETAL ULTRASOUND: ICD-10-CM

## 2025-05-13 DIAGNOSIS — Z36.2 ENCOUNTER FOR FOLLOW-UP ULTRASOUND OF FETAL ANATOMY: Primary | ICD-10-CM

## 2025-05-13 PROCEDURE — 76811 OB US DETAILED SNGL FETUS: CPT

## 2025-05-13 RX ORDER — PRENATAL VIT NO.126/IRON/FOLIC 28MG-0.8MG
TABLET ORAL DAILY
COMMUNITY

## 2025-05-13 NOTE — PROGRESS NOTES
Pt reports that she is doing well and denies vaginal bleeding, cramping, contractions or LOF at this time. Reports active fetal movement. Denies HA, visual changes or epigastric pain. Denies any additional complaints at time of appointment. Next OB appointment scheduled for 5/20.    Vitals:    05/13/25 1408   BP: 125/78   Pulse: 106   Temp: 97.7 °F (36.5 °C)   SpO2: 99%

## 2025-05-13 NOTE — LETTER
May 15, 2025     Peewee Nolasco MD  [address]  [address]  [place] [address]    Patient: Miguel Le   YOB: 1995   Date of Visit: 2025       Dear Peewee Nolasco MD,    Thank you for referring Miguel Le to me for evaluation. Below is a copy of my consult note.    If you have questions, please do not hesitate to call me. I look forward to following Miguel along with you.         Sincerely,        Elda Mai MD        CC: No Recipients    Pt reports that she is doing well and denies vaginal bleeding, cramping, contractions or LOF at this time. Reports active fetal movement. Denies HA, visual changes or epigastric pain. Denies any additional complaints at time of appointment. Next OB appointment scheduled for .    Vitals:    25 1408   BP: 125/78   Pulse: 106   Temp: 97.7 °F (36.5 °C)   SpO2: 99%          MATERNAL FETAL MEDICINE Consult Note    Dear Dr Peewee Nolasco MD:    Thank you for your kind referral of Miguel Le.  As you know, she is a 29 y.o.   28w2d gestation (Estimated Date of Delivery: 25). This is a consult.      Her antepartum course is complicated by:  Suboptimal anatomy --continues to be suboptimal.       Aneuploidy Screening: LOW RISK MaterniT Genome - Blood, (01/15/2025 15:48)   Carrier Screening:not completed     HPI: No contractions, LOF, vaginal bleeding. Normal fetal movement.   She denies headache, vision changes, shortness of breath, acute changes in edema, and RUQ pain.       Review of History:  History reviewed. No pertinent past medical history.  Past Surgical History:   Procedure Laterality Date   • TONSILLECTOMY         OB Hx:   OB History    Para Term  AB Living   2    1 0   SAB IAB Ectopic Molar Multiple Live Births        0      # Outcome Date GA Lbr Craig/2nd Weight Sex Type Anes PTL Lv   2 Current            1 AB 2019                 Social History     Socioeconomic History   • Marital status:    Tobacco  Use   • Smoking status: Never     Passive exposure: Never   • Smokeless tobacco: Never   Vaping Use   • Vaping status: Never Used   Substance and Sexual Activity   • Alcohol use: Not Currently     Alcohol/week: 3.0 standard drinks of alcohol     Types: 3 Cans of beer per week     Comment: Will drink socially   • Drug use: Never   • Sexual activity: Yes     Partners: Male     Birth control/protection: Condom, Natural family planning/Rhythm     Family History   Problem Relation Age of Onset   • Vision loss Mother         Glaucoma since age 49   • Heart disease Maternal Grandfather    • Cancer Maternal Grandmother         Both grandmothers had breast cancer age 50 and 70 not hereditarty   • Heart disease Paternal Grandfather    • Cancer Paternal Grandmother         Both grandmothers had breast cancer age 50 and 70 not hereditarty   • Anxiety disorder Sister       No Known Allergies   Current Outpatient Medications on File Prior to Visit   Medication Sig Dispense Refill   • Magnesium 100 MG capsule Take  by mouth.     • prenatal vitamin (prenatal, CLASSIC, vitamin) tablet Take  by mouth Daily.       No current facility-administered medications on file prior to visit.        Past obstetric, gynecological, medical, surgical, family and social history reviewed.  Relevant lab work and imaging reviewed.    Review of systems  As above in HPI     Vitals:    05/13/25 1408   BP: 125/78   BP Location: Right arm   Patient Position: Sitting   Pulse: 106   Temp: 97.7 °F (36.5 °C)   TempSrc: Temporal   SpO2: 99%   Weight: 121 kg (267 lb)       PHYSICAL EXAM   General: No acute distress  Respiratory: No increased work of breathing  Cardiac: Mild tachycardia  appropriate for pregnancy  Abdominal: Gravid, nontender  Extremities: No significant edema  Neuro/psych: Alert and oriented, appropriate mood      ULTRASOUND   Please view full ultrasound note on Imaging tab in ViewPoint.    Cephalic presentation  Anterior placenta  Fetal biometry  is consistent with dates EFW 85%, AC 60%  MICAH 12 cm which is normal  BPP 8/8  The fetal anatomic survey remains suboptimal secondary to fetal position.  The visualized anatomy appears normal.  See above for suboptimal images    ASSESSMENT/COUNSELING:    Diagnoses and all orders for this visit:    1. Encounter for follow-up ultrasound of fetal anatomy (Primary)    2. 28 weeks gestation of pregnancy         SUBOPTIMAL ANATOMY  Findings of ultrasound with patient today.  Patient was referred for suboptimal anatomy at primary OBs.  Again today, secondary to fetal position many of the same views remain suboptimal.    Given that the fetal heart has not been cleared, we will schedule her for follow-up in 3 weeks, we will preemptively schedule for fetal echocardiogram to follow.  We will cancel if we are able to successfully visualize fetal cardiac structures.    Summary of Plan  -Completion of anatomy with MFM in 3 weeks  -Schedule for fetal echo in 4-5 weeks; cancel if we complete the cardiac anatomy at next visit  -Routine prenatal care   -Starting at 28 weeks: Fetal movement instructions given continue daily until delivery; instructed to report to labor and delivery if cannot achieve more than 10 kicks in one hour or if she perceives a decrease in fetal movement    Follow-up: 3 WEEKS COMPLETION, cancel echo if cardiac anatomy is normal.      Thank you for the consult and opportunity to care for this patient.  Please feel free to reach out with any questions or concerns.    I spent 20 minutes caring for this patient on this date of service. This time includes time spent by me in the following activities: preparing for the visit, reviewing tests, obtaining and/or reviewing a separately obtained history, performing a medically appropriate examination and/or evaluation, counseling and educating the patient/family/caregiver and independently interpreting results and communicating that information with the  patient/family/caregiver with greater than 50% spent in counseling and coordination of care.         I spent 6 minutes on the separately reported service of US imaging not included in the time used to support the E/M service also reported today.      Elda Mai MD   Maternal Fetal Medicine-[facility]  Office: [phone]

## 2025-05-14 NOTE — PROGRESS NOTES
MATERNAL FETAL MEDICINE Consult Note    Dear Dr Peewee Nolasco MD:    Thank you for your kind referral of Miguel Le.  As you know, she is a 29 y.o.   28w2d gestation (Estimated Date of Delivery: 25). This is a consult.      Her antepartum course is complicated by:  Suboptimal anatomy --continues to be suboptimal.       Aneuploidy Screening: LOW RISK MaterniT Genome - Blood, (01/15/2025 15:48)   Carrier Screening:not completed     HPI: No contractions, LOF, vaginal bleeding. Normal fetal movement.   She denies headache, vision changes, shortness of breath, acute changes in edema, and RUQ pain.       Review of History:  History reviewed. No pertinent past medical history.  Past Surgical History:   Procedure Laterality Date    TONSILLECTOMY         OB Hx:   OB History    Para Term  AB Living   2    1 0   SAB IAB Ectopic Molar Multiple Live Births        0      # Outcome Date GA Lbr Craig/2nd Weight Sex Type Anes PTL Lv   2 Current            1 AB 2019                 Social History     Socioeconomic History    Marital status:    Tobacco Use    Smoking status: Never     Passive exposure: Never    Smokeless tobacco: Never   Vaping Use    Vaping status: Never Used   Substance and Sexual Activity    Alcohol use: Not Currently     Alcohol/week: 3.0 standard drinks of alcohol     Types: 3 Cans of beer per week     Comment: Will drink socially    Drug use: Never    Sexual activity: Yes     Partners: Male     Birth control/protection: Condom, Natural family planning/Rhythm     Family History   Problem Relation Age of Onset    Vision loss Mother         Glaucoma since age 49    Heart disease Maternal Grandfather     Cancer Maternal Grandmother         Both grandmothers had breast cancer age 50 and 70 not hereditarty    Heart disease Paternal Grandfather     Cancer Paternal Grandmother         Both grandmothers had breast cancer age 50 and 70 not hereditarty    Anxiety disorder Sister       No  Known Allergies   Current Outpatient Medications on File Prior to Visit   Medication Sig Dispense Refill    Magnesium 100 MG capsule Take  by mouth.      prenatal vitamin (prenatal, CLASSIC, vitamin) tablet Take  by mouth Daily.       No current facility-administered medications on file prior to visit.        Past obstetric, gynecological, medical, surgical, family and social history reviewed.  Relevant lab work and imaging reviewed.    Review of systems  As above in HPI     Vitals:    05/13/25 1408   BP: 125/78   BP Location: Right arm   Patient Position: Sitting   Pulse: 106   Temp: 97.7 °F (36.5 °C)   TempSrc: Temporal   SpO2: 99%   Weight: 121 kg (267 lb)       PHYSICAL EXAM   General: No acute distress  Respiratory: No increased work of breathing  Cardiac: Mild tachycardia  appropriate for pregnancy  Abdominal: Gravid, nontender  Extremities: No significant edema  Neuro/psych: Alert and oriented, appropriate mood      ULTRASOUND   Please view full ultrasound note on Imaging tab in ViewPoint.    Cephalic presentation  Anterior placenta  Fetal biometry is consistent with dates EFW 85%, AC 60%  MICAH 12 cm which is normal  BPP 8/8  The fetal anatomic survey remains suboptimal secondary to fetal position.  The visualized anatomy appears normal.  See above for suboptimal images    ASSESSMENT/COUNSELING:    Diagnoses and all orders for this visit:    1. Encounter for follow-up ultrasound of fetal anatomy (Primary)    2. 28 weeks gestation of pregnancy         SUBOPTIMAL ANATOMY  Findings of ultrasound with patient today.  Patient was referred for suboptimal anatomy at primary OBs.  Again today, secondary to fetal position many of the same views remain suboptimal.    Given that the fetal heart has not been cleared, we will schedule her for follow-up in 3 weeks, we will preemptively schedule for fetal echocardiogram to follow.  We will cancel if we are able to successfully visualize fetal cardiac structures.    Summary  of Plan  -Completion of anatomy with MFM in 3 weeks  -Schedule for fetal echo in 4-5 weeks; cancel if we complete the cardiac anatomy at next visit  -Routine prenatal care   -Starting at 28 weeks: Fetal movement instructions given continue daily until delivery; instructed to report to labor and delivery if cannot achieve more than 10 kicks in one hour or if she perceives a decrease in fetal movement    Follow-up: 3 WEEKS COMPLETION, cancel echo if cardiac anatomy is normal.      Thank you for the consult and opportunity to care for this patient.  Please feel free to reach out with any questions or concerns.    I spent 20 minutes caring for this patient on this date of service. This time includes time spent by me in the following activities: preparing for the visit, reviewing tests, obtaining and/or reviewing a separately obtained history, performing a medically appropriate examination and/or evaluation, counseling and educating the patient/family/caregiver and independently interpreting results and communicating that information with the patient/family/caregiver with greater than 50% spent in counseling and coordination of care.         I spent 6 minutes on the separately reported service of US imaging not included in the time used to support the E/M service also reported today.      Elda Mai MD   Maternal Fetal Medicine-[facility]  Office: [phone]

## 2025-05-15 ENCOUNTER — PATIENT ROUNDING (BHMG ONLY) (OUTPATIENT)
Dept: OBSTETRICS AND GYNECOLOGY | Facility: CLINIC | Age: 30
End: 2025-05-15
Payer: COMMERCIAL

## 2025-05-20 ENCOUNTER — ROUTINE PRENATAL (OUTPATIENT)
Dept: OBSTETRICS AND GYNECOLOGY | Facility: CLINIC | Age: 30
End: 2025-05-20
Payer: COMMERCIAL

## 2025-05-20 VITALS — WEIGHT: 267 LBS | BODY MASS INDEX: 36.21 KG/M2 | SYSTOLIC BLOOD PRESSURE: 120 MMHG | DIASTOLIC BLOOD PRESSURE: 76 MMHG

## 2025-05-20 DIAGNOSIS — Z3A.29 29 WEEKS GESTATION OF PREGNANCY: Primary | ICD-10-CM

## 2025-05-20 LAB
GLUCOSE UR STRIP-MCNC: NEGATIVE MG/DL
PROT UR STRIP-MCNC: NEGATIVE MG/DL

## 2025-05-20 NOTE — PROGRESS NOTES
CC: Obstetric visit    HPI: 29-year-old  2 para 0 presents at 29 and 1 sevenths weeks for her obstetric visit.  Her baby is moving actively.  She has no complaints today.    Review of systems    This is negative for fever or chills.  Negative for nausea or vomiting.  Negative for abdominal or pelvic pain.    Physical examination    The abdomen is soft and gravid.  There is no epigastric tenderness.  No fundal tenderness.  No CVA tenderness.  No suprapubic tenderness.  Extremities are equal in size    Assessment and plan    1.  Intrauterine pregnancy at 29 and 1 sevenths weeks  2.  Blood type is B+.  RhoGAM will not be needed.  3.  MFM consult was reviewed and discussed with the patient.  No abnormalities were found, but cardiac anatomy was still suboptimally delineated by ultrasound.  The patient has a repeat ultrasound scheduled with [facility] and an echocardiogram planned if this is not successful.  4.  The patient inquired about the possibility of an LGA infant based on her ultrasound findings.  We discussed LGA and options for its management should it be found.  We will follow fetal growth with ultrasounds as pregnancy progresses.  Further recommendations later in the third trimester.

## 2025-06-01 NOTE — PROGRESS NOTES
MATERNAL FETAL MEDICINE Consult Note    Dear Dr Soria ref. provider found:    Thank you for your kind referral of Miguel Le.  As you know, she is a 29 y.o.   31w1d gestation (Estimated Date of Delivery: 25). This is a follow up consult.      Her antepartum course is complicated by:  Suboptimal anatomy   BMI > 35      Aneuploidy Screening: LOW RISK MaterniT Genome - Blood, (01/15/2025 15:48)   Carrier Screening:not completed     HPI: No contractions, LOF, vaginal bleeding. Normal fetal movement.   She denies headache, vision changes, shortness of breath, acute changes in edema, and RUQ pain.     Review of History:  History reviewed. No pertinent past medical history.  Past Surgical History:   Procedure Laterality Date    TONSILLECTOMY         OB Hx:   OB History    Para Term  AB Living   2    1 0   SAB IAB Ectopic Molar Multiple Live Births        0      # Outcome Date GA Lbr Craig/2nd Weight Sex Type Anes PTL Lv   2 Current            1 AB 2019                 Social History     Socioeconomic History    Marital status:    Tobacco Use    Smoking status: Never     Passive exposure: Never    Smokeless tobacco: Never   Vaping Use    Vaping status: Never Used   Substance and Sexual Activity    Alcohol use: Not Currently     Alcohol/week: 3.0 standard drinks of alcohol     Types: 3 Cans of beer per week     Comment: Will drink socially    Drug use: Never    Sexual activity: Yes     Partners: Male     Birth control/protection: Condom, Natural family planning/Rhythm     Family History   Problem Relation Age of Onset    Vision loss Mother         Glaucoma since age 49    Heart disease Maternal Grandfather     Cancer Maternal Grandmother         Both grandmothers had breast cancer age 50 and 70 not hereditarty    Heart disease Paternal Grandfather     Cancer Paternal Grandmother         Both grandmothers had breast cancer age 50 and 70 not hereditarty    Anxiety disorder Sister       No Known  "Allergies   Current Outpatient Medications on File Prior to Visit   Medication Sig Dispense Refill    prenatal vitamin (prenatal, CLASSIC, vitamin) tablet Take  by mouth Daily.      Magnesium 100 MG capsule Take  by mouth. (Patient not taking: Reported on 6/3/2025)       No current facility-administered medications on file prior to visit.        Past obstetric, gynecological, medical, surgical, family and social history reviewed.  Relevant lab work and imaging reviewed.    Review of systems  As above in HPI     Vitals:    25 1540   BP: 125/72   BP Location: Right arm   Patient Position: Sitting   Pulse: 85   Temp: 97.7 °F (36.5 °C)   TempSrc: Temporal   SpO2: 98%   Weight: 126 kg (277 lb 6.4 oz)   Height: 182.9 cm (72\")       PHYSICAL EXAM   General: No acute distress  Neuro/psych: Alert and oriented, appropriate mood    ULTRASOUND   Please view full ultrasound note on Imaging tab in [facility].    Breech, anterior placenta,   EFW 2129 g, 4 lb 11 oz, 93%, AC 49%  MICAH 18 cm  BPP 4/8 (-2 movement, -2 tone). Follow up NST was reactive for final score of 6/10  Incomplete anatomy due to fetal position: still need profile, palate, mandible and orbits/lens. All other follow up views of fetal anatomy appear WNL at this time    ASSESSMENT/COUNSELING:    Diagnoses and all orders for this visit:    1. BMI 35.0-35.9,adult (Primary)    2. Screening, , for fetal anatomic survey    3. Encounter for  screening for fetal growth restriction           SUBOPTIMAL ANATOMY  Cardiac imaging was improved today. However, the aortic arch and branching right outflow views remain limited. Hence, the fetal echo for next week was not cancelled.     EQUIVOCAL FETAL TESTING  Mrs. Miguel Le reports that she did have lunch and she has been well hydrated. Infact, she had her water bottle with her during her visit. She reports good fetal movement at home. However, she has not been conducting a specific routine.       Follow-up: "   - Repeat NST in AM  - Daily count to 10 kick counts  - Fetal echo in one week.     Thank you for the consult and opportunity to care for this patient.  Please feel free to reach out with any questions or concerns.    I spent 20 minutes caring for this patient on this date of service. This time includes time spent by me in the following activities: preparing for the visit, reviewing tests, obtaining and/or reviewing a separately obtained history, performing a medically appropriate examination and/or evaluation, counseling and educating the patient/family/caregiver and independently interpreting results and communicating that information with the patient/family/caregiver with greater than 50% spent in counseling and coordination of care.         I spent 6 minutes on the separately reported service of US imaging not included in the time used to support the E/M service also reported today.      Antwan Dawson MD   Maternal Fetal Medicine-[facility]  Office: [phone]

## 2025-06-02 ENCOUNTER — TRANSCRIBE ORDERS (OUTPATIENT)
Dept: ULTRASOUND IMAGING | Facility: HOSPITAL | Age: 30
End: 2025-06-02
Payer: COMMERCIAL

## 2025-06-02 DIAGNOSIS — O28.3 ABNORMAL FETAL ULTRASOUND: Primary | ICD-10-CM

## 2025-06-03 ENCOUNTER — OFFICE VISIT (OUTPATIENT)
Dept: OBSTETRICS AND GYNECOLOGY | Facility: CLINIC | Age: 30
End: 2025-06-03
Payer: COMMERCIAL

## 2025-06-03 ENCOUNTER — HOSPITAL ENCOUNTER (OUTPATIENT)
Dept: ULTRASOUND IMAGING | Facility: HOSPITAL | Age: 30
Discharge: HOME | End: 2025-06-03
Admitting: NURSE PRACTITIONER
Payer: COMMERCIAL

## 2025-06-03 VITALS
DIASTOLIC BLOOD PRESSURE: 72 MMHG | SYSTOLIC BLOOD PRESSURE: 125 MMHG | OXYGEN SATURATION: 98 % | TEMPERATURE: 97.7 F | HEART RATE: 85 BPM | WEIGHT: 277.4 LBS | HEIGHT: 72 IN | BODY MASS INDEX: 37.57 KG/M2

## 2025-06-03 DIAGNOSIS — O28.3 ABNORMAL FETAL ULTRASOUND: ICD-10-CM

## 2025-06-03 DIAGNOSIS — Z36.4 ENCOUNTER FOR ANTENATAL SCREENING FOR FETAL GROWTH RESTRICTION: ICD-10-CM

## 2025-06-03 DIAGNOSIS — Z36.89 SCREENING, ANTENATAL, FOR FETAL ANATOMIC SURVEY: ICD-10-CM

## 2025-06-03 PROCEDURE — 76819 FETAL BIOPHYS PROFIL W/O NST: CPT

## 2025-06-03 PROCEDURE — 76816 OB US FOLLOW-UP PER FETUS: CPT

## 2025-06-03 NOTE — PROGRESS NOTES
Pt reports that she is doing well and denies vaginal bleeding, cramping, contractions or LOF at this time. Reports active fetal movement. Reviewed when to call OB office or present to L&D for evaluation with symptoms such as decreased fetal movement, vaginal bleeding, LOF or ctxs. Pt verbalized understanding. Denies HA, visual changes or epigastric pain. Denies any additional complaints at time of appointment. Next OB appointment scheduled for 6/10.       Vitals:    06/03/25 1540   BP: 125/72   Pulse: 85   Temp: 97.7 °F (36.5 °C)   SpO2: 98%

## 2025-06-03 NOTE — LETTER
Monique 3, 2025     Peewee Nolasco MD  [address]  [address]  [place] [address]    Patient: Miguel Le   YOB: 1995   Date of Visit: 6/3/2025       Dear Peewee Nolasco MD    Miguel Le was in my office today. Below is a copy of my note.    If you have questions, please do not hesitate to call me. I look forward to following Miguel along with you.         Sincerely,        Antwan Dawson MD        CC: No Recipients    MATERNAL FETAL MEDICINE Consult Note    Dear Dr Soria ref. provider found:    Thank you for your kind referral of Miguel Le.  As you know, she is a 29 y.o.   31w1d gestation (Estimated Date of Delivery: 25). This is a follow up consult.      Her antepartum course is complicated by:  Suboptimal anatomy   BMI > 35      Aneuploidy Screening: LOW RISK MaterniT Genome - Blood, (01/15/2025 15:48)   Carrier Screening:not completed     HPI: No contractions, LOF, vaginal bleeding. Normal fetal movement.   She denies headache, vision changes, shortness of breath, acute changes in edema, and RUQ pain.     Review of History:  History reviewed. No pertinent past medical history.  Past Surgical History:   Procedure Laterality Date   • TONSILLECTOMY         OB Hx:   OB History    Para Term  AB Living   2    1 0   SAB IAB Ectopic Molar Multiple Live Births        0      # Outcome Date GA Lbr Craig/2nd Weight Sex Type Anes PTL Lv   2 Current            1 AB 2019                 Social History     Socioeconomic History   • Marital status:    Tobacco Use   • Smoking status: Never     Passive exposure: Never   • Smokeless tobacco: Never   Vaping Use   • Vaping status: Never Used   Substance and Sexual Activity   • Alcohol use: Not Currently     Alcohol/week: 3.0 standard drinks of alcohol     Types: 3 Cans of beer per week     Comment: Will drink socially   • Drug use: Never   • Sexual activity: Yes     Partners: Male     Birth control/protection: Condom,  "Natural family planning/Rhythm     Family History   Problem Relation Age of Onset   • Vision loss Mother         Glaucoma since age 49   • Heart disease Maternal Grandfather    • Cancer Maternal Grandmother         Both grandmothers had breast cancer age 50 and 70 not hereditarty   • Heart disease Paternal Grandfather    • Cancer Paternal Grandmother         Both grandmothers had breast cancer age 50 and 70 not hereditarty   • Anxiety disorder Sister       No Known Allergies   Current Outpatient Medications on File Prior to Visit   Medication Sig Dispense Refill   • prenatal vitamin (prenatal, CLASSIC, vitamin) tablet Take  by mouth Daily.     • Magnesium 100 MG capsule Take  by mouth. (Patient not taking: Reported on 6/3/2025)       No current facility-administered medications on file prior to visit.        Past obstetric, gynecological, medical, surgical, family and social history reviewed.  Relevant lab work and imaging reviewed.    Review of systems  As above in HPI     Vitals:    25 1540   BP: 125/72   BP Location: Right arm   Patient Position: Sitting   Pulse: 85   Temp: 97.7 °F (36.5 °C)   TempSrc: Temporal   SpO2: 98%   Weight: 126 kg (277 lb 6.4 oz)   Height: 182.9 cm (72\")       PHYSICAL EXAM   General: No acute distress  Neuro/psych: Alert and oriented, appropriate mood    ULTRASOUND   Please view full ultrasound note on Imaging tab in [facility].    Breech, anterior placenta,   EFW 2129 g, 4 lb 11 oz, 93%, AC 49%  MICAH 18 cm  BPP 4/8 (-2 movement, -2 tone). Follow up NST was reactive for final score of 6/10  Incomplete anatomy due to fetal position: still need profile, palate, mandible and orbits/lens. All other follow up views of fetal anatomy appear WNL at this time    ASSESSMENT/COUNSELING:    Diagnoses and all orders for this visit:    1. BMI 35.0-35.9,adult (Primary)    2. Screening, , for fetal anatomic survey    3. Encounter for  screening for fetal growth restriction       "     SUBOPTIMAL ANATOMY  Cardiac imaging was improved today. However, the aortic arch and branching right outflow views remain limited. Hence, the fetal echo for next week was not cancelled.     EQUIVOCAL FETAL TESTING  Mrs. Miguel Le reports that she did have lunch and she has been well hydrated. Infact, she had her water bottle with her during her visit. She reports good fetal movement at home. However, she has not been conducting a specific routine.       Follow-up:   - Repeat NST in AM  - Daily count to 10 kick counts  - Fetal echo in one week.     Thank you for the consult and opportunity to care for this patient.  Please feel free to reach out with any questions or concerns.    I spent 20 minutes caring for this patient on this date of service. This time includes time spent by me in the following activities: preparing for the visit, reviewing tests, obtaining and/or reviewing a separately obtained history, performing a medically appropriate examination and/or evaluation, counseling and educating the patient/family/caregiver and independently interpreting results and communicating that information with the patient/family/caregiver with greater than 50% spent in counseling and coordination of care.         I spent 6 minutes on the separately reported service of US imaging not included in the time used to support the E/M service also reported today.      Antwan Dawson MD   Maternal Fetal Medicine-[facility]  Office: [phone]      Pt reports that she is doing well and denies vaginal bleeding, cramping, contractions or LOF at this time. Reports active fetal movement. Reviewed when to call OB office or present to L&D for evaluation with symptoms such as decreased fetal movement, vaginal bleeding, LOF or ctxs. Pt verbalized understanding. Denies HA, visual changes or epigastric pain. Denies any additional complaints at time of appointment. Next OB appointment scheduled for 6/10.       Vitals:     06/03/25 1540   BP: 125/72   Pulse: 85   Temp: 97.7 °F (36.5 °C)   SpO2: 98%

## 2025-06-03 NOTE — NON STRESS TEST
Reason for test: BPP 4/8 (-2 for movement and -2 for tone)    Date of Test: 6/3/2025  Time frame of test: Start Time: 1537 End Time: 1601  RN NST Interpretation: Reactive     FHR baseline: 140  Accelerations: present  Variability: minimal, periods of moderate   Decelerations: absent     TOCO: quiet

## 2025-06-04 ENCOUNTER — CLINICAL SUPPORT (OUTPATIENT)
Dept: OBSTETRICS AND GYNECOLOGY | Facility: CLINIC | Age: 30
End: 2025-06-04
Payer: COMMERCIAL

## 2025-06-04 ENCOUNTER — OFFICE VISIT (OUTPATIENT)
Dept: OBSTETRICS AND GYNECOLOGY | Facility: CLINIC | Age: 30
End: 2025-06-04
Payer: COMMERCIAL

## 2025-06-04 VITALS
BODY MASS INDEX: 37.06 KG/M2 | OXYGEN SATURATION: 98 % | WEIGHT: 273.6 LBS | TEMPERATURE: 97.5 F | HEART RATE: 96 BPM | DIASTOLIC BLOOD PRESSURE: 84 MMHG | HEIGHT: 72 IN | SYSTOLIC BLOOD PRESSURE: 112 MMHG

## 2025-06-04 DIAGNOSIS — Z91.89: Primary | ICD-10-CM

## 2025-06-04 NOTE — NON STRESS TEST
Reason for test: Scheduled    Date of Test: 6/4/2025  Time frame of test: Start Time: 0826 End Time: 0848                             RN NST Interpretation: Reactive (reviewed with Dr. Adams)     FHR baseline: 140  Accelerations: present  Variability: moderate   Decelerations: absent     TOCO: quiet

## 2025-06-04 NOTE — PROGRESS NOTES
Pt reports that she is doing well and denies vaginal bleeding, cramping, contractions or LOF at this time. Reports active fetal movement. Reviewed when to call OB office or present to L&D for evaluation with symptoms such as decreased fetal movement, vaginal bleeding, LOF or ctxs. Pt verbalized understanding. Denies HA, visual changes or epigastric pain. Denies any additional complaints at time of appointment. Next OB appointment scheduled for 6/10.       Vitals:    06/04/25 0833   BP: 112/84   Pulse: 96   Temp: 97.5 °F (36.4 °C)   SpO2: 98%

## 2025-06-04 NOTE — LETTER
2025     Peewee Nolasco MD  [address]  [facility] [address]  [place] [id number]    Patient: Miguel Le   YOB: 1995   Date of Visit: 2025       Dear Peewee Nolasco MD    Miguel Le was in my office today. Below is a copy of my note.    If you have questions, please do not hesitate to call me. I look forward to following Miguel along with you.         Sincerely,        Coco Adams MD      MATERNAL FETAL MEDICINE Consult Note    Dear  No ref. provider found:    Thank you for your kind referral of Miguel Le.  As you know, she is a 29 y.o.   31w2d gestation (Estimated Date of Delivery: 25). This is a follow up consult.      Her antepartum course is complicated by:  Suboptimal anatomy   BMI > 35  Equivocal BPP yesterday 6/10.  NST Cat 1 today.      Aneuploidy Screening: LOW RISK MaterniT Genome - Blood, (01/15/2025 15:48)   Carrier Screening:not completed     HPI: No contractions, LOF, vaginal bleeding. Normal fetal movement.     Review of History:  No past medical history on file.  Past Surgical History:   Procedure Laterality Date   • TONSILLECTOMY         OB Hx:   OB History    Para Term  AB Living   2    1 0   SAB IAB Ectopic Molar Multiple Live Births        0      # Outcome Date GA Lbr Craig/2nd Weight Sex Type Anes PTL Lv   2 Current            1 AB 2019                 Social History     Socioeconomic History   • Marital status:    Tobacco Use   • Smoking status: Never     Passive exposure: Never   • Smokeless tobacco: Never   Vaping Use   • Vaping status: Never Used   Substance and Sexual Activity   • Alcohol use: Not Currently     Alcohol/week: 3.0 standard drinks of alcohol     Types: 3 Cans of beer per week     Comment: Will drink socially   • Drug use: Never   • Sexual activity: Yes     Partners: Male     Birth control/protection: Condom, Natural family planning/Rhythm     Family History   Problem Relation Age of Onset   • Vision loss Mother          "Glaucoma since age 49   • Heart disease Maternal Grandfather    • Cancer Maternal Grandmother         Both grandmothers had breast cancer age 50 and 70 not hereditarty   • Heart disease Paternal Grandfather    • Cancer Paternal Grandmother         Both grandmothers had breast cancer age 50 and 70 not hereditarty   • Anxiety disorder Sister       No Known Allergies   Current Outpatient Medications on File Prior to Visit   Medication Sig Dispense Refill   • prenatal vitamin (prenatal, CLASSIC, vitamin) tablet Take  by mouth Daily.     • Magnesium 100 MG capsule Take  by mouth. (Patient not taking: Reported on 6/4/2025)       No current facility-administered medications on file prior to visit.        Past obstetric, gynecological, medical, surgical, family and social history reviewed.  Relevant lab work and imaging reviewed.    Review of systems  As above in HPI     Vitals:    06/04/25 0833   BP: 112/84   BP Location: Right arm   Patient Position: Sitting   Pulse: 96   Temp: 97.5 °F (36.4 °C)   TempSrc: Temporal   SpO2: 98%   Weight: 124 kg (273 lb 9.6 oz)   Height: 182.9 cm (72\")       PHYSICAL EXAM   General: No acute distress  Neuro/psych: Alert and oriented, appropriate mood    NST  DATE: June 4, 2025  PROCEDURE: nonstress test  INTERPRETING PROVIDER: Coco Adams MD  GESTATIONAL AGE: 31w2d gestation  DIAGNOSIS: equivocal BPP  INDICATION: equivocal BPP  START TIME: 08:25  END TIME: 08:45  FINDINGS: NST: 130 bpm, reactive, moderate variability (amplitude 6-25 bpm), +accels, No decels.   TOCOMETRY: quiet  *Patient was monitored for a minimum of 20 minutes.    ASSESSMENT/COUNSELING:    Diagnoses and all orders for this visit:    1. At risk for alteration in fetal status (Primary)    2. BMI 35.0-35.9,adult        SUBOPTIMAL ANATOMY  ECHO next week.  Otherwise anatomy completed.      EQUIVOCAL FETAL TESTING  Resolved.   Due to 6/10 BPP yesterday, would recommend weekly BPP or NST x 2-3 weeks at OB.  Pt amenable.  " Discussed likely false positive and if testing re-assuring can de-escalate unless another reason for ANFS.  Due to BMI >35 will need weekly BPP or NST at 36-37 weeks anyway.  She will continue kick counts.      Thank you for the consult and opportunity to care for this patient.  Please feel free to reach out with any questions or concerns.      I spent 15 minutes caring for this patient on this date of service. This time includes time spent by me in the following activities: preparing for the visit, reviewing tests, obtaining and/or reviewing a separately obtained history, performing a medically appropriate examination and/or evaluation, counseling and educating the patient/family/caregiver and independently interpreting results and communicating that information with the patient/family/caregiver with greater than 50% spent in counseling and coordination of care.     I confirm that all copied/forwarded documentation in this record has been carefully reviewed, updated as necessary, and accurately reflects the patient's current status and plan of care.       Coco Adams MD FACOG  Maternal Fetal Medicine-[facility]  Office: [phone]  [email or web address]

## 2025-06-04 NOTE — PROGRESS NOTES
MATERNAL FETAL MEDICINE Consult Note    Dear Dr Soria ref. provider found:    Thank you for your kind referral of Miguel Le.  As you know, she is a 29 y.o.   31w2d gestation (Estimated Date of Delivery: 25). This is a follow up consult.      Her antepartum course is complicated by:  Suboptimal anatomy   BMI > 35  Equivocal BPP yesterday 6/10.  NST Cat 1 today.      Aneuploidy Screening: LOW RISK MaterniT Genome - Blood, (01/15/2025 15:48)   Carrier Screening:not completed     HPI: No contractions, LOF, vaginal bleeding. Normal fetal movement.     Review of History:  No past medical history on file.  Past Surgical History:   Procedure Laterality Date    TONSILLECTOMY         OB Hx:   OB History    Para Term  AB Living   2    1 0   SAB IAB Ectopic Molar Multiple Live Births        0      # Outcome Date GA Lbr Craig/2nd Weight Sex Type Anes PTL Lv   2 Current            1 AB 2019                 Social History     Socioeconomic History    Marital status:    Tobacco Use    Smoking status: Never     Passive exposure: Never    Smokeless tobacco: Never   Vaping Use    Vaping status: Never Used   Substance and Sexual Activity    Alcohol use: Not Currently     Alcohol/week: 3.0 standard drinks of alcohol     Types: 3 Cans of beer per week     Comment: Will drink socially    Drug use: Never    Sexual activity: Yes     Partners: Male     Birth control/protection: Condom, Natural family planning/Rhythm     Family History   Problem Relation Age of Onset    Vision loss Mother         Glaucoma since age 49    Heart disease Maternal Grandfather     Cancer Maternal Grandmother         Both grandmothers had breast cancer age 50 and 70 not hereditarty    Heart disease Paternal Grandfather     Cancer Paternal Grandmother         Both grandmothers had breast cancer age 50 and 70 not hereditarty    Anxiety disorder Sister       No Known Allergies   Current Outpatient Medications on File Prior to Visit  "  Medication Sig Dispense Refill    prenatal vitamin (prenatal, CLASSIC, vitamin) tablet Take  by mouth Daily.      Magnesium 100 MG capsule Take  by mouth. (Patient not taking: Reported on 6/4/2025)       No current facility-administered medications on file prior to visit.        Past obstetric, gynecological, medical, surgical, family and social history reviewed.  Relevant lab work and imaging reviewed.    Review of systems  As above in HPI     Vitals:    06/04/25 0833   BP: 112/84   BP Location: Right arm   Patient Position: Sitting   Pulse: 96   Temp: 97.5 °F (36.4 °C)   TempSrc: Temporal   SpO2: 98%   Weight: 124 kg (273 lb 9.6 oz)   Height: 182.9 cm (72\")       PHYSICAL EXAM   General: No acute distress  Neuro/psych: Alert and oriented, appropriate mood    NST  DATE: June 4, 2025  PROCEDURE: nonstress test  INTERPRETING PROVIDER: Coco Adams MD  GESTATIONAL AGE: 31w2d gestation  DIAGNOSIS: equivocal BPP  INDICATION: equivocal BPP  START TIME: 08:25  END TIME: 08:45  FINDINGS: NST: 130 bpm, reactive, moderate variability (amplitude 6-25 bpm), +accels, No decels.   TOCOMETRY: quiet  *Patient was monitored for a minimum of 20 minutes.    ASSESSMENT/COUNSELING:    Diagnoses and all orders for this visit:    1. At risk for alteration in fetal status (Primary)    2. BMI 35.0-35.9,adult        SUBOPTIMAL ANATOMY  ECHO next week.  Otherwise anatomy completed.      EQUIVOCAL FETAL TESTING  Resolved.   Due to 6/10 BPP yesterday, would recommend weekly BPP or NST x 2-3 weeks at OB.  Pt amenable.  Discussed likely false positive and if testing re-assuring can de-escalate unless another reason for ANFS.  Due to BMI >35 will need weekly BPP or NST at 36-37 weeks anyway.  She will continue kick counts.      Thank you for the consult and opportunity to care for this patient.  Please feel free to reach out with any questions or concerns.      I spent 15 minutes caring for this patient on this date of service. This time " includes time spent by me in the following activities: preparing for the visit, reviewing tests, obtaining and/or reviewing a separately obtained history, performing a medically appropriate examination and/or evaluation, counseling and educating the patient/family/caregiver and independently interpreting results and communicating that information with the patient/family/caregiver with greater than 50% spent in counseling and coordination of care.     I confirm that all copied/forwarded documentation in this record has been carefully reviewed, updated as necessary, and accurately reflects the patient's current status and plan of care.       Coco Adams MD FACOG  Maternal Fetal Medicine-[facility]  Office: [phone]  [email or web address]

## 2025-06-09 ENCOUNTER — TRANSCRIBE ORDERS (OUTPATIENT)
Dept: ULTRASOUND IMAGING | Facility: HOSPITAL | Age: 30
End: 2025-06-09
Payer: COMMERCIAL

## 2025-06-09 DIAGNOSIS — O28.3 ABNORMAL FETAL ULTRASOUND: Primary | ICD-10-CM

## 2025-06-10 ENCOUNTER — HOSPITAL ENCOUNTER (OUTPATIENT)
Dept: ULTRASOUND IMAGING | Facility: HOSPITAL | Age: 30
Discharge: HOME | End: 2025-06-10
Admitting: STUDENT IN AN ORGANIZED HEALTH CARE EDUCATION/TRAINING PROGRAM
Payer: COMMERCIAL

## 2025-06-10 ENCOUNTER — ROUTINE PRENATAL (OUTPATIENT)
Dept: OBSTETRICS AND GYNECOLOGY | Facility: CLINIC | Age: 30
End: 2025-06-10
Payer: COMMERCIAL

## 2025-06-10 VITALS — WEIGHT: 275 LBS | BODY MASS INDEX: 37.3 KG/M2 | SYSTOLIC BLOOD PRESSURE: 112 MMHG | DIASTOLIC BLOOD PRESSURE: 78 MMHG

## 2025-06-10 DIAGNOSIS — Z3A.32 32 WEEKS GESTATION OF PREGNANCY: Primary | ICD-10-CM

## 2025-06-10 DIAGNOSIS — O28.3 ABNORMAL FETAL ULTRASOUND: ICD-10-CM

## 2025-06-10 LAB
GLUCOSE UR STRIP-MCNC: NEGATIVE MG/DL
PROT UR STRIP-MCNC: NEGATIVE MG/DL

## 2025-06-10 PROCEDURE — 76827 ECHO EXAM OF FETAL HEART: CPT

## 2025-06-10 PROCEDURE — 76825 ECHO EXAM OF FETAL HEART: CPT

## 2025-06-10 PROCEDURE — 93325 DOPPLER ECHO COLOR FLOW MAPG: CPT

## 2025-06-10 NOTE — PROGRESS NOTES
CC: Obstetric visit    HPI: 29-year-old  2 para 0 presents at 32 and 1 sevenths weeks for her obstetric visit.  Her baby is moving actively.  She has no complaints today.    Review of systems    This is negative for headaches or vision changes.  Negative for abdominal pain.  Negative for vaginal bleeding.    Physical examination    The abdomen is soft and gravid.  There is no epigastric tenderness.  No fundal tenderness.  No CVA tenderness.  No suprapubic tenderness.  Extremities are equal in size    Assessment and plan    1.  Intrauterine pregnancy at 32 and 1 sevenths weeks  2.  The patient had suboptimal cardiac views on her screening ultrasound.  Adequate cardiac views could not be obtained by maternal-fetal medicine either.  For this reason, a fetal echocardiogram is scheduled for today.  Also, last BPP was 6 out of 8.  For this reason, nonstress test has been recommended today.  I counseled the patient and answered her questions.  She agrees with this recommendation.  If NST is reactive today and next week, weekly NSTs will no longer be needed.    NST is reactive today.

## 2025-06-18 ENCOUNTER — HOSPITAL ENCOUNTER (OUTPATIENT)
Facility: HOSPITAL | Age: 30
Discharge: HOME | End: 2025-06-18
Attending: OBSTETRICS & GYNECOLOGY | Admitting: OBSTETRICS & GYNECOLOGY
Payer: COMMERCIAL

## 2025-06-18 VITALS
RESPIRATION RATE: 16 BRPM | TEMPERATURE: 97.7 F | HEIGHT: 72 IN | SYSTOLIC BLOOD PRESSURE: 120 MMHG | WEIGHT: 275 LBS | OXYGEN SATURATION: 99 % | HEART RATE: 76 BPM | DIASTOLIC BLOOD PRESSURE: 70 MMHG | BODY MASS INDEX: 37.25 KG/M2

## 2025-06-18 PROBLEM — Z34.90 PREGNANT: Status: ACTIVE | Noted: 2025-06-18

## 2025-06-18 PROCEDURE — G0463 HOSPITAL OUTPT CLINIC VISIT: HCPCS

## 2025-06-18 PROCEDURE — G0378 HOSPITAL OBSERVATION PER HR: HCPCS

## 2025-06-18 PROCEDURE — 59025 FETAL NON-STRESS TEST: CPT

## 2025-06-18 NOTE — NON STRESS TEST
Miguel Le, a  at 33w2d with an MILIND of 2025, by Last Menstrual Period, was seen at [facility] LABOR DELIVERY for a nonstress test.    Chief Complaint   Patient presents with    Non-stress Test     Pt to L&D from the office.  Pt states she needs monitoring.       Patient Active Problem List   Diagnosis    Seizure-like activity    Family history of seizure disorder    Encounter for  screening for fetal growth restriction    BMI 35.0-35.9,adult    Pregnant       Start Time: 1400  Stop Time: 1605        Reactive NST

## 2025-06-18 NOTE — NURSING NOTE
Patient given discharge instructions and verbalized understanding. Patient ambulated off unit with belongings.

## 2025-06-24 ENCOUNTER — DOCUMENTATION (OUTPATIENT)
Dept: NURSERY | Facility: HOSPITAL | Age: 30
End: 2025-06-24
Payer: COMMERCIAL

## 2025-06-24 NOTE — PROGRESS NOTES
DATE: 2025  MRN: [id number]      Patient Name: Miguel Le  YOB: 1995    Dear Health Care Provider,    The patient listed above was discussed at the [facility] Fetal Care Conference.     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    Please feel free to call with any questions:    Maternal Fetal Medicine at [phone]  Pediatric Cardiology at [phone]  Neonatology at [phone]    Electronically signed by Gael Ramirez MD, 25, 12:39 PM EDT.

## 2025-06-25 ENCOUNTER — TELEPHONE (OUTPATIENT)
Dept: OBSTETRICS AND GYNECOLOGY | Facility: CLINIC | Age: 30
End: 2025-06-25
Payer: COMMERCIAL

## 2025-06-30 ENCOUNTER — ROUTINE PRENATAL (OUTPATIENT)
Dept: OBSTETRICS AND GYNECOLOGY | Facility: CLINIC | Age: 30
End: 2025-06-30
Payer: COMMERCIAL

## 2025-06-30 ENCOUNTER — PREP FOR SURGERY (OUTPATIENT)
Dept: OTHER | Facility: HOSPITAL | Age: 30
End: 2025-06-30
Payer: COMMERCIAL

## 2025-06-30 VITALS — SYSTOLIC BLOOD PRESSURE: 130 MMHG | WEIGHT: 280 LBS | BODY MASS INDEX: 37.97 KG/M2 | DIASTOLIC BLOOD PRESSURE: 83 MMHG

## 2025-06-30 DIAGNOSIS — Z3A.35 35 WEEKS GESTATION OF PREGNANCY: Primary | ICD-10-CM

## 2025-06-30 LAB
GLUCOSE UR STRIP-MCNC: NEGATIVE MG/DL
PROT UR STRIP-MCNC: NEGATIVE MG/DL

## 2025-06-30 PROCEDURE — 99213 OFFICE O/P EST LOW 20 MIN: CPT | Performed by: OBSTETRICS & GYNECOLOGY

## 2025-06-30 PROCEDURE — 81002 URINALYSIS NONAUTO W/O SCOPE: CPT | Performed by: OBSTETRICS & GYNECOLOGY

## 2025-06-30 RX ORDER — LIDOCAINE HYDROCHLORIDE 10 MG/ML
0.5 INJECTION, SOLUTION INFILTRATION; PERINEURAL ONCE AS NEEDED
OUTPATIENT
Start: 2025-06-30

## 2025-06-30 RX ORDER — KETOROLAC TROMETHAMINE 30 MG/ML
30 INJECTION, SOLUTION INTRAMUSCULAR; INTRAVENOUS ONCE
OUTPATIENT
Start: 2025-06-30 | End: 2025-06-30

## 2025-06-30 RX ORDER — SODIUM CHLORIDE 0.9 % (FLUSH) 0.9 %
10 SYRINGE (ML) INJECTION EVERY 12 HOURS SCHEDULED
OUTPATIENT
Start: 2025-06-30

## 2025-06-30 RX ORDER — OXYTOCIN/0.9 % SODIUM CHLORIDE 30/500 ML
250 PLASTIC BAG, INJECTION (ML) INTRAVENOUS CONTINUOUS
OUTPATIENT
Start: 2025-06-30 | End: 2025-06-30

## 2025-06-30 RX ORDER — SODIUM CHLORIDE 0.9 % (FLUSH) 0.9 %
10 SYRINGE (ML) INJECTION AS NEEDED
OUTPATIENT
Start: 2025-06-30

## 2025-06-30 RX ORDER — CARBOPROST TROMETHAMINE 250 UG/ML
250 INJECTION, SOLUTION INTRAMUSCULAR
OUTPATIENT
Start: 2025-06-30

## 2025-06-30 RX ORDER — MISOPROSTOL 200 UG/1
800 TABLET ORAL ONCE AS NEEDED
OUTPATIENT
Start: 2025-06-30

## 2025-06-30 RX ORDER — SODIUM CHLORIDE 9 MG/ML
40 INJECTION, SOLUTION INTRAVENOUS AS NEEDED
OUTPATIENT
Start: 2025-06-30

## 2025-06-30 RX ORDER — METHYLERGONOVINE MALEATE 0.2 MG/ML
200 INJECTION INTRAVENOUS ONCE AS NEEDED
OUTPATIENT
Start: 2025-06-30

## 2025-06-30 RX ORDER — OXYTOCIN/0.9 % SODIUM CHLORIDE 30/500 ML
999 PLASTIC BAG, INJECTION (ML) INTRAVENOUS ONCE
OUTPATIENT
Start: 2025-06-30

## 2025-06-30 RX ORDER — ACETAMINOPHEN 500 MG
1000 TABLET ORAL ONCE
OUTPATIENT
Start: 2025-06-30 | End: 2025-06-30

## 2025-06-30 NOTE — PROGRESS NOTES
CC: Obstetric visit    HPI: 29-year-old  2 para 0 presents at 35-0/7 weeks for her obstetric visit.  Her baby is moving actively.  She had a fetal echocardiogram with a possible abnormality of the aortic valve found.  Delivery at [facility] was considered appropriate.  The patient is comfortable with this.    Review of systems    This is negative for fever and chills.  Negative for headaches or vision changes.  Negative for abdominal or pelvic pain.    Physical examination    The abdomen is soft and gravid.  There is no epigastric tenderness.  No fundal tenderness.  No CVA tenderness.  No suprapubic tenderness.  Extremities are equal in size    Assessment and plan    1.  Intrauterine pregnancy at 35-0/7 weeks  2.  BPP is recommended every week by maternal-fetal medicine.  Counseled and questions answered.  3.  Ultrasound from today was reviewed.  This shows persistent breech presentation.  I counseled the patient and answered her questions.  She understands that a vaginal breech delivery is not recommended.  We discussed the benefits and risks of a primary  delivery versus an external cephalic version.  I answered the patient's questions.  She would like to proceed with a  delivery.  If her baby reverts to cephalic presentation, she would like to cancel this.

## 2025-07-01 ENCOUNTER — TELEPHONE (OUTPATIENT)
Dept: OBSTETRICS AND GYNECOLOGY | Facility: CLINIC | Age: 30
End: 2025-07-01
Payer: COMMERCIAL

## 2025-07-07 ENCOUNTER — ROUTINE PRENATAL (OUTPATIENT)
Dept: OBSTETRICS AND GYNECOLOGY | Facility: CLINIC | Age: 30
End: 2025-07-07
Payer: COMMERCIAL

## 2025-07-07 VITALS — SYSTOLIC BLOOD PRESSURE: 116 MMHG | DIASTOLIC BLOOD PRESSURE: 78 MMHG | BODY MASS INDEX: 38.25 KG/M2 | WEIGHT: 282 LBS

## 2025-07-07 DIAGNOSIS — Z3A.36 36 WEEKS GESTATION OF PREGNANCY: Primary | ICD-10-CM

## 2025-07-07 LAB
GLUCOSE UR STRIP-MCNC: NEGATIVE MG/DL
PROT UR STRIP-MCNC: ABNORMAL MG/DL

## 2025-07-07 PROCEDURE — 0502F SUBSEQUENT PRENATAL CARE: CPT | Performed by: OBSTETRICS & GYNECOLOGY

## 2025-07-07 NOTE — PROGRESS NOTES
CC: Obstetric visit    HPI: 29-year-old  2 para 0 presents at 36-0/7 weeks for her obstetric visit.  Her baby is moving actively.  She has no complaints today.    Review of systems    This is negative for fever or chills.  Negative for right upper quadrant pain.  Negative for headaches or vision changes.    Physical examination    The abdomen is soft and gravid.  There is no epigastric tenderness.  No fundal tenderness.  No CVA tenderness.  No suprapubic tenderness.  Pelvic examination reveals normal female external genitalia.  There is no blood in the vaginal vault.  The cervix is closed, thick and posterior.  Extremities are equal in size    Assessment and plan    1.  Intrauterine pregnancy at 36-0/7 weeks  2.  Group B strep cultures performed.  Counseled.  3.  Persistent breech presentation.  Counseled.   delivery has been scheduled.  4.  Reviewed plan for delivery.  Neonatology plans to assess the patient at delivery and an echocardiogram will be performed after birth.  5.  Ultrasounds have been performed weekly due to elevated BMI.  The patient would like to discontinue this and I feel that it is completely appropriate to do so.

## 2025-07-13 LAB
CLINDAMYCIN ISLT KB: ABNORMAL
GP B STREP DNA SPEC QL NAA+PROBE: POSITIVE
ORGANISM ID: ABNORMAL

## 2025-07-15 ENCOUNTER — ROUTINE PRENATAL (OUTPATIENT)
Dept: OBSTETRICS AND GYNECOLOGY | Facility: CLINIC | Age: 30
End: 2025-07-15
Payer: COMMERCIAL

## 2025-07-15 VITALS — BODY MASS INDEX: 38.65 KG/M2 | WEIGHT: 285 LBS | SYSTOLIC BLOOD PRESSURE: 114 MMHG | DIASTOLIC BLOOD PRESSURE: 76 MMHG

## 2025-07-15 DIAGNOSIS — Z3A.37 37 WEEKS GESTATION OF PREGNANCY: Primary | ICD-10-CM

## 2025-07-15 NOTE — PROGRESS NOTES
CC: Obstetric visit    HPI: 29-year-old  2 para 0 presents at 37 and 1 sevenths weeks for her obstetric visit.  Her baby is moving actively.  She has no complaints today.    Review of systems    This is negative for headaches or vision changes.  Negative for nausea or vomiting.  Negative for upper abdominal pain.    Physical examination    The abdomen is soft and gravid.  There is no epigastric tenderness.  No fundal tenderness.  No CVA tenderness.  No suprapubic tenderness.  The baby remains breech by Leopold's maneuvers  Extremities are equal in size    Assessment and plan    1.  Intrauterine pregnancy at 37 and 1 sevenths weeks  2.  Breech presentation.   delivery is planned at 39 weeks.  Counseled regarding additional ultrasounds during the pregnancy.  The patient prefers to defer these and I agree.  A bedside ultrasound will be performed preoperatively to confirm persistent breech presentation.  3.  Positive group B strep status.  Counseled.